# Patient Record
Sex: FEMALE | Race: WHITE | Employment: OTHER | URBAN - METROPOLITAN AREA
[De-identification: names, ages, dates, MRNs, and addresses within clinical notes are randomized per-mention and may not be internally consistent; named-entity substitution may affect disease eponyms.]

---

## 2019-04-04 ENCOUNTER — INITIAL CONSULT (OUTPATIENT)
Dept: NEUROSURGERY | Age: 69
End: 2019-04-04
Payer: COMMERCIAL

## 2019-04-04 VITALS
HEIGHT: 61 IN | WEIGHT: 145 LBS | DIASTOLIC BLOOD PRESSURE: 62 MMHG | SYSTOLIC BLOOD PRESSURE: 132 MMHG | HEART RATE: 89 BPM | BODY MASS INDEX: 27.38 KG/M2

## 2019-04-04 DIAGNOSIS — M54.41 CHRONIC MIDLINE LOW BACK PAIN WITH BILATERAL SCIATICA: Primary | ICD-10-CM

## 2019-04-04 DIAGNOSIS — G89.29 CHRONIC MIDLINE LOW BACK PAIN WITH BILATERAL SCIATICA: Primary | ICD-10-CM

## 2019-04-04 DIAGNOSIS — M54.42 CHRONIC MIDLINE LOW BACK PAIN WITH BILATERAL SCIATICA: Primary | ICD-10-CM

## 2019-04-04 PROCEDURE — 99243 OFF/OP CNSLTJ NEW/EST LOW 30: CPT | Performed by: NEUROLOGICAL SURGERY

## 2019-04-04 RX ORDER — OXYCODONE AND ACETAMINOPHEN 7.5; 325 MG/1; MG/1
1 TABLET ORAL 3 TIMES DAILY PRN
Status: ON HOLD | COMMUNITY
End: 2019-11-24 | Stop reason: HOSPADM

## 2019-04-04 ASSESSMENT — ENCOUNTER SYMPTOMS
BACK PAIN: 1
ABDOMINAL PAIN: 0
EYES NEGATIVE: 1
RESPIRATORY NEGATIVE: 1
BOWEL INCONTINENCE: 0
GASTROINTESTINAL NEGATIVE: 1
ALLERGIC/IMMUNOLOGIC NEGATIVE: 1

## 2019-04-04 NOTE — PATIENT INSTRUCTIONS
hours. Put a thin cloth between the ice pack and your skin. · Take pain medicines exactly as directed. ? If the doctor gave you a prescription medicine for pain, take it as prescribed. ? If you are not taking a prescription pain medicine, ask your doctor if you can take an over-the-counter medicine. · Take short walks several times a day. You can start with 5 to 10 minutes, 3 or 4 times a day, and work up to longer walks. Walk on level surfaces and avoid hills and stairs until your back is better. · Return to work and other activities as soon as you can. Continued rest without activity is usually not good for your back. · To prevent future back pain, do exercises to stretch and strengthen your back and stomach. Learn how to use good posture, safe lifting techniques, and proper body mechanics. When should you call for help? Call your doctor now or seek immediate medical care if:    · You have new or worsening numbness in your legs.     · You have new or worsening weakness in your legs. (This could make it hard to stand up.)     · You lose control of your bladder or bowels.    Watch closely for changes in your health, and be sure to contact your doctor if:    · You have a fever, lose weight, or don't feel well.     · You do not get better as expected. Where can you learn more? Go to [email or web address]://[email or web address]. org and sign in to your [employer] account. Enter F148 in the [facility] box to learn more about \"Back Pain: Care Instructions. \"     If you do not have an account, please click on the \"Sign Up Now\" link. Current as of: September 20, 2018  Content Version: 11.9  © 4923-3285 [employer]. Care instructions adapted under license by [facility] ([facility]). If you have questions about a medical condition or this instruction, always ask your healthcare professional. [address] any warranty or liability for your use of this information.

## 2019-04-04 NOTE — PROGRESS NOTES
Subjective:      Patient ID: Anival Knapp is a 80 y.o. female. Back Pain   This is a chronic problem. The current episode started more than 1 year ago. The problem occurs constantly. The problem has been gradually worsening since onset. The pain is present in the lumbar spine. The quality of the pain is described as aching, burning, cramping, shooting and stabbing. The pain radiates to the right thigh, right knee, left thigh, right foot, left knee and left foot. The pain is at a severity of 8/10. The pain is moderate. The pain is worse during the day. The symptoms are aggravated by position. Stiffness is present in the morning. Associated symptoms include leg pain and weakness. Pertinent negatives include no abdominal pain, bladder incontinence, bowel incontinence, chest pain, dysuria, fever, headaches, numbness, paresis, paresthesias, pelvic pain, perianal numbness, tingling or weight loss. She has tried NSAIDs, ice, analgesics and heat for the symptoms. The treatment provided mild relief. Review of Systems   Constitutional: Negative. Negative for fever and weight loss. HENT: Negative. Eyes: Negative. Respiratory: Negative. Cardiovascular: Negative. Negative for chest pain. Gastrointestinal: Negative. Negative for abdominal pain and bowel incontinence. Endocrine: Negative. Genitourinary: Negative. Negative for bladder incontinence, dysuria and pelvic pain. Musculoskeletal: Positive for back pain. Skin: Negative. Allergic/Immunologic: Negative. Neurological: Positive for weakness. Negative for tingling, numbness, headaches and paresthesias. Hematological: Negative. Psychiatric/Behavioral: Negative. Objective:   Physical Exam   Constitutional: She is oriented to person, place, and time. She appears well-developed and well-nourished. HENT:   Head: Normocephalic and atraumatic.    Right Ear: External ear normal.   Left Ear: External ear normal.   Nose: Nose normal.   Mouth/Throat: Oropharynx is clear and moist. No oropharyngeal exudate. Eyes: Pupils are equal, round, and reactive to light. Conjunctivae and EOM are normal. Right eye exhibits no discharge. Left eye exhibits no discharge. No scleral icterus. Neck: Normal range of motion. Neck supple. No JVD present. No tracheal deviation present. No thyromegaly present. Pulmonary/Chest: Effort normal and breath sounds normal. No respiratory distress. Abdominal: She exhibits no distension. Musculoskeletal: Normal range of motion. She exhibits no edema, tenderness or deformity. Lymphadenopathy:     She has no cervical adenopathy. Neurological: She is alert and oriented to person, place, and time. She has normal strength. She is not disoriented. She displays no atrophy, no tremor and normal reflexes. No cranial nerve deficit or sensory deficit. She exhibits normal muscle tone. She displays a negative Romberg sign. She displays no seizure activity. Coordination and gait normal. GCS eye subscore is 4. GCS verbal subscore is 5. GCS motor subscore is 6. She displays no Babinski's sign on the right side. She displays no Babinski's sign on the left side. Reflex Scores:       Tricep reflexes are 2+ on the right side and 2+ on the left side. Bicep reflexes are 2+ on the right side and 2+ on the left side. Brachioradialis reflexes are 2+ on the right side and 2+ on the left side. Patellar reflexes are 2+ on the right side and 2+ on the left side. Achilles reflexes are 2+ on the right side and 2+ on the left side. Skin: Capillary refill takes less than 2 seconds. No rash noted. No erythema. No pallor. Psychiatric: She has a normal mood and affect. Her behavior is normal. Judgment and thought content normal.   Vitals reviewed. Assessment:      80year old lady who presents with back pain that radiates into both legs. Her MRI shows stenosis at L4-L5 and prior fusion at L5-S1.   She also has positive FABERS bilaterally. This is a workers comp related visit and her allowed codes are 722.2, 724.4 and 738.4. I will send a letter to her referring physician. Plan:      I will get a myelogram to assess her prior fusion and to rule out adjacent segment stenosis. I will also get hip x-rays and flexion extension lumbar x-rays. She needs to quit smoking prior to any additional surgical intervention.         Andreea Mendoza MD

## 2019-04-04 NOTE — LETTER
[address] [facility] [id number]  Phone: [phone]  Fax: [phone]    Mary Peterson MD      April 4, 2019     [address] [place] [address]    Patient: Romie Fernandes  MR Number: <[id number]>  YOB: 1950  Date of Visit: 4/4/2019    Dear  [address] [facility] [address]:    Thank you for the request for consultation for Simone Flanagan to me for the evaluation of back pain. Below are the relevant portions of my assessment and plan of care. Assessment:     80year old lady who presents with back pain that radiates into both legs. Her MRI shows stenosis at L4-L5 and prior fusion at L5-S1. She also has positive FABERS bilaterally. This is a workers comp related visit and her allowed codes are 722.2, 724.4 and 738.4. I will send a letter to her referring physician. Plan:   I will get a myelogram to assess her prior fusion and to rule out adjacent segment stenosis. I will also get hip x-rays and flexion extension lumbar x-rays. She needs to quit smoking prior to any additional surgical intervention. If you have questions, please do not hesitate to call me. I look forward to following Andrew Silvestre along with you.     Sincerely,        Mary Peterson MD

## 2019-04-15 DIAGNOSIS — M54.41 CHRONIC MIDLINE LOW BACK PAIN WITH BILATERAL SCIATICA: Primary | ICD-10-CM

## 2019-04-15 DIAGNOSIS — G89.29 CHRONIC MIDLINE LOW BACK PAIN WITH BILATERAL SCIATICA: Primary | ICD-10-CM

## 2019-04-15 DIAGNOSIS — M54.42 CHRONIC MIDLINE LOW BACK PAIN WITH BILATERAL SCIATICA: Primary | ICD-10-CM

## 2019-04-15 DIAGNOSIS — Z01.812 PRE-PROCEDURE LAB EXAM: ICD-10-CM

## 2019-04-17 ENCOUNTER — HOSPITAL ENCOUNTER (OUTPATIENT)
Age: 69
Discharge: HOME | End: 2019-04-19
Payer: COMMERCIAL

## 2019-04-17 ENCOUNTER — HOSPITAL ENCOUNTER (OUTPATIENT)
Dept: GENERAL RADIOLOGY | Age: 69
Discharge: HOME | End: 2019-04-19
Payer: COMMERCIAL

## 2019-04-17 DIAGNOSIS — M54.41 CHRONIC MIDLINE LOW BACK PAIN WITH BILATERAL SCIATICA: ICD-10-CM

## 2019-04-17 DIAGNOSIS — G89.29 CHRONIC MIDLINE LOW BACK PAIN WITH BILATERAL SCIATICA: ICD-10-CM

## 2019-04-17 DIAGNOSIS — M54.42 CHRONIC MIDLINE LOW BACK PAIN WITH BILATERAL SCIATICA: ICD-10-CM

## 2019-04-17 PROCEDURE — 73521 X-RAY EXAM HIPS BI 2 VIEWS: CPT

## 2019-04-17 PROCEDURE — 72120 X-RAY BEND ONLY L-S SPINE: CPT

## 2019-05-01 ENCOUNTER — HOSPITAL ENCOUNTER (OUTPATIENT)
Dept: CT IMAGING | Age: 69
Discharge: HOME | End: 2019-05-03
Payer: COMMERCIAL

## 2019-05-01 ENCOUNTER — HOSPITAL ENCOUNTER (OUTPATIENT)
Dept: GENERAL RADIOLOGY | Age: 69
Discharge: HOME | End: 2019-05-03
Payer: COMMERCIAL

## 2019-05-01 VITALS
DIASTOLIC BLOOD PRESSURE: 65 MMHG | HEIGHT: 61 IN | BODY MASS INDEX: 27.19 KG/M2 | RESPIRATION RATE: 16 BRPM | SYSTOLIC BLOOD PRESSURE: 169 MMHG | TEMPERATURE: 98 F | HEART RATE: 68 BPM | OXYGEN SATURATION: 96 % | WEIGHT: 144 LBS

## 2019-05-01 DIAGNOSIS — G89.29 CHRONIC MIDLINE LOW BACK PAIN WITH BILATERAL SCIATICA: ICD-10-CM

## 2019-05-01 DIAGNOSIS — M54.41 CHRONIC MIDLINE LOW BACK PAIN WITH BILATERAL SCIATICA: ICD-10-CM

## 2019-05-01 DIAGNOSIS — M54.42 CHRONIC MIDLINE LOW BACK PAIN WITH BILATERAL SCIATICA: ICD-10-CM

## 2019-05-01 LAB
INR BLD: 1
PLATELET # BLD: 216 E9/L (ref 130–450)
PROTHROMBIN TIME: 11.5 SEC (ref 9.3–12.4)

## 2019-05-01 PROCEDURE — 72132 CT LUMBAR SPINE W/DYE: CPT

## 2019-05-01 PROCEDURE — 85049 AUTOMATED PLATELET COUNT: CPT

## 2019-05-01 PROCEDURE — 6360000004 HC RX CONTRAST MEDICATION: Performed by: NEUROLOGICAL SURGERY

## 2019-05-01 PROCEDURE — 85610 PROTHROMBIN TIME: CPT

## 2019-05-01 PROCEDURE — 36415 COLL VENOUS BLD VENIPUNCTURE: CPT

## 2019-05-01 PROCEDURE — 7100000011 HC PHASE II RECOVERY - ADDTL 15 MIN

## 2019-05-01 PROCEDURE — 72265 MYELOGRAPHY L-S SPINE: CPT

## 2019-05-01 PROCEDURE — 7100000010 HC PHASE II RECOVERY - FIRST 15 MIN

## 2019-05-01 RX ADMIN — IOPAMIDOL 15 ML: 408 INJECTION, SOLUTION INTRATHECAL at 12:33

## 2019-05-01 ASSESSMENT — PAIN - FUNCTIONAL ASSESSMENT: PAIN_FUNCTIONAL_ASSESSMENT: 0-10

## 2019-05-01 ASSESSMENT — PAIN DESCRIPTION - DESCRIPTORS: DESCRIPTORS: CONSTANT;DULL

## 2019-05-01 NOTE — PROGRESS NOTES
1000-Patient arrived ambulatory with   to Radiology department for Lumbar Myelogram. Allergies, home medications, H&P and fasting instructions reviewed with patient. Vital signs taken. IV placed, blood obtained, IV flushed and prn adapter attached. Blood sample sent to lab for ordered tests. 1025- Procedural instructions given, questions answered, understanding expressed and consent signed. 805 Megan Grimes- Dr Delicia Thao here and ta;johns to patient. 1200-To xray per cart. 1315- report from [facility]. Patient eating and drinking well. 1350-Discharge instructions reviewed with patient and understanding expressed. Signed and copy given to patient. Patient prepared for discharge, up and dressed, denies discomfort, dressing over puncture site dry and intact. 1400-Taken to door ambulatory and released with discharge instructions to .

## 2019-05-01 NOTE — PROGRESS NOTES
1255 - Patient returned to angio holding, Vitals taken. Patient awake and alert. No s/s of any complication noted or reported. Bandage at site clean, dry, and intact, no drainage noted. 1315 - Patient eating and drinking well. Site clean, dry, and intact. Report given to [facility].

## 2019-05-01 NOTE — PROGRESS NOTES
Interventional Radiology Preprocedure Note    HPI:  Andrey Fu is a 77 y.o. female with history of L5-S1 fusion and persistent BLE pain and numbness who was referred to Interventional Radiology by Dr. Remigio Lundborg for a myelogram.    Allergies: Allergies   Allergen Reactions    Biaxin [Clarithromycin] Hives    Darvon [Fd&C Red #40-Fd&C Yellow #10-Propoxyphene]     Keflex [Cephalexin] Nausea Only    Meloxicam Nausea Only    Methadone Nausea Only    Niacin And Related     Nsaids Other (See Comments)     unknown    Penicillins     Polysorbate Other (See Comments)     unknown    Statins Other (See Comments)     unknown    Yellow Dyes (Non-Tartrazine) Other (See Comments)    Sulfa Antibiotics Rash       Vital Signs:  Vitals:    05/01/19 1011   BP: (!) 152/73   Pulse: 82   Resp: 16   Temp: 97.8 °F (36.6 °C)   TempSrc: Oral   SpO2: 96%   Weight: 144 lb (65.3 kg)   Height: 5' 1\" (1.549 m)     Exam:  No apparent distress. AAO x 3. Face symmetric. Strength 5/5 in BUE. No drift. Strength 5/5 in RLE. 4/5 in left iliopsoas, otherwise 5/5 in LLE. Lab Results:    Lab Results   Component Value Date    INR 1.0 05/01/2019    PROTIME 11.5 05/01/2019     05/01/2019       Imaging:  I have personally reviewed the patient's pertinent imaging. ***    Assessment and Plan:  ***    The patient appears able to tolerate the procedure. Informed Consent:  Informed consent was obtained ***. The details of the procedure, and its risks, benefits, and alternatives, were discussed. These risks include, but are not limited to, ***. The *** indicated understanding and gave permission to proceed.     Electronically signed by Jordan Cali MD on 5/1/2019 at 10:52 AM

## 2019-05-30 ENCOUNTER — TELEPHONE (OUTPATIENT)
Dept: NEUROSURGERY | Age: 69
End: 2019-05-30

## 2019-07-23 ENCOUNTER — OFFICE VISIT (OUTPATIENT)
Dept: NEUROSURGERY | Age: 69
End: 2019-07-23
Payer: COMMERCIAL

## 2019-07-23 VITALS
WEIGHT: 145 LBS | HEART RATE: 81 BPM | DIASTOLIC BLOOD PRESSURE: 65 MMHG | BODY MASS INDEX: 27.38 KG/M2 | HEIGHT: 61 IN | SYSTOLIC BLOOD PRESSURE: 114 MMHG

## 2019-07-23 DIAGNOSIS — M54.42 CHRONIC MIDLINE LOW BACK PAIN WITH BILATERAL SCIATICA: Primary | ICD-10-CM

## 2019-07-23 DIAGNOSIS — G89.29 CHRONIC MIDLINE LOW BACK PAIN WITH BILATERAL SCIATICA: Primary | ICD-10-CM

## 2019-07-23 DIAGNOSIS — M54.41 CHRONIC MIDLINE LOW BACK PAIN WITH BILATERAL SCIATICA: Primary | ICD-10-CM

## 2019-07-23 PROCEDURE — 99213 OFFICE O/P EST LOW 20 MIN: CPT | Performed by: NEUROLOGICAL SURGERY

## 2019-08-13 ENCOUNTER — PREP FOR PROCEDURE (OUTPATIENT)
Dept: NEUROSURGERY | Age: 69
End: 2019-08-13

## 2019-08-13 DIAGNOSIS — M48.061 SPINAL STENOSIS OF LUMBAR REGION WITHOUT NEUROGENIC CLAUDICATION: Primary | ICD-10-CM

## 2019-08-13 RX ORDER — SODIUM CHLORIDE 0.9 % (FLUSH) 0.9 %
10 SYRINGE (ML) INJECTION EVERY 12 HOURS SCHEDULED
Status: CANCELLED | OUTPATIENT
Start: 2019-08-13

## 2019-08-13 RX ORDER — SODIUM CHLORIDE 0.9 % (FLUSH) 0.9 %
10 SYRINGE (ML) INJECTION PRN
Status: CANCELLED | OUTPATIENT
Start: 2019-08-13

## 2019-08-13 RX ORDER — SODIUM CHLORIDE 9 MG/ML
INJECTION, SOLUTION INTRAVENOUS CONTINUOUS
Status: CANCELLED | OUTPATIENT
Start: 2019-08-13

## 2019-09-25 ENCOUNTER — ANESTHESIA EVENT (OUTPATIENT)
Dept: OPERATING ROOM | Age: 69
End: 2019-09-25

## 2019-09-25 ENCOUNTER — HOSPITAL ENCOUNTER (OUTPATIENT)
Dept: PREADMISSION TESTING | Age: 69
Discharge: HOME | End: 2019-09-25
Payer: COMMERCIAL

## 2019-09-25 ENCOUNTER — HOSPITAL ENCOUNTER (OUTPATIENT)
Dept: GENERAL RADIOLOGY | Age: 69
Discharge: HOME | End: 2019-09-27
Payer: COMMERCIAL

## 2019-09-25 VITALS
SYSTOLIC BLOOD PRESSURE: 162 MMHG | OXYGEN SATURATION: 96 % | HEART RATE: 72 BPM | BODY MASS INDEX: 28.13 KG/M2 | WEIGHT: 149 LBS | HEIGHT: 61 IN | RESPIRATION RATE: 16 BRPM | TEMPERATURE: 97.8 F | DIASTOLIC BLOOD PRESSURE: 72 MMHG

## 2019-09-25 DIAGNOSIS — M48.061 SPINAL STENOSIS OF LUMBAR REGION WITHOUT NEUROGENIC CLAUDICATION: ICD-10-CM

## 2019-09-25 DIAGNOSIS — Z01.818 PRE-OP TESTING: Primary | ICD-10-CM

## 2019-09-25 LAB
ABO/RH: NORMAL
ANION GAP SERPL CALCULATED.3IONS-SCNC: 9 MMOL/L (ref 7–16)
ANTIBODY SCREEN: NORMAL
BACTERIA: NORMAL /HPF
BASOPHILS ABSOLUTE: 0.04 E9/L (ref 0–0.2)
BASOPHILS RELATIVE PERCENT: 0.9 % (ref 0–2)
BILIRUBIN URINE: NEGATIVE
BLOOD, URINE: NEGATIVE
BUN BLDV-MCNC: 15 MG/DL (ref 8–23)
CALCIUM SERPL-MCNC: 9.5 MG/DL (ref 8.6–10.2)
CHLORIDE BLD-SCNC: 100 MMOL/L (ref 98–107)
CLARITY: CLEAR
CO2: 28 MMOL/L (ref 22–29)
COLOR: YELLOW
CREAT SERPL-MCNC: 1.1 MG/DL (ref 0.5–1)
EOSINOPHILS ABSOLUTE: 0.28 E9/L (ref 0.05–0.5)
EOSINOPHILS RELATIVE PERCENT: 6.2 % (ref 0–6)
GFR AFRICAN AMERICAN: 60
GFR NON-AFRICAN AMERICAN: 49 ML/MIN/1.73
GLUCOSE BLD-MCNC: 180 MG/DL (ref 74–99)
GLUCOSE URINE: NEGATIVE MG/DL
HCT VFR BLD CALC: 39.5 % (ref 34–48)
HEMOGLOBIN: 12.1 G/DL (ref 11.5–15.5)
IMMATURE GRANULOCYTES #: 0.01 E9/L
IMMATURE GRANULOCYTES %: 0.2 % (ref 0–5)
INR BLD: 1
KETONES, URINE: NEGATIVE MG/DL
LEUKOCYTE ESTERASE, URINE: ABNORMAL
LYMPHOCYTES ABSOLUTE: 1.33 E9/L (ref 1.5–4)
LYMPHOCYTES RELATIVE PERCENT: 29.2 % (ref 20–42)
MCH RBC QN AUTO: 28.3 PG (ref 26–35)
MCHC RBC AUTO-ENTMCNC: 30.6 % (ref 32–34.5)
MCV RBC AUTO: 92.3 FL (ref 80–99.9)
MONOCYTES ABSOLUTE: 0.38 E9/L (ref 0.1–0.95)
MONOCYTES RELATIVE PERCENT: 8.4 % (ref 2–12)
NEUTROPHILS ABSOLUTE: 2.51 E9/L (ref 1.8–7.3)
NEUTROPHILS RELATIVE PERCENT: 55.1 % (ref 43–80)
NITRITE, URINE: NEGATIVE
PDW BLD-RTO: 14 FL (ref 11.5–15)
PH UA: 6.5 (ref 5–9)
PLATELET # BLD: 179 E9/L (ref 130–450)
PMV BLD AUTO: 11 FL (ref 7–12)
POTASSIUM REFLEX MAGNESIUM: 4.1 MMOL/L (ref 3.5–5)
PROTEIN UA: NEGATIVE MG/DL
PROTHROMBIN TIME: 11 SEC (ref 9.3–12.4)
RBC # BLD: 4.28 E12/L (ref 3.5–5.5)
RBC UA: NORMAL /HPF (ref 0–2)
SODIUM BLD-SCNC: 137 MMOL/L (ref 132–146)
SPECIFIC GRAVITY UA: <=1.005 (ref 1–1.03)
UROBILINOGEN, URINE: 0.2 E.U./DL
WBC # BLD: 4.6 E9/L (ref 4.5–11.5)
WBC UA: NORMAL /HPF (ref 0–5)

## 2019-09-25 PROCEDURE — 86900 BLOOD TYPING SEROLOGIC ABO: CPT

## 2019-09-25 PROCEDURE — 87081 CULTURE SCREEN ONLY: CPT

## 2019-09-25 PROCEDURE — 85025 COMPLETE CBC W/AUTO DIFF WBC: CPT

## 2019-09-25 PROCEDURE — 85610 PROTHROMBIN TIME: CPT

## 2019-09-25 PROCEDURE — 80048 BASIC METABOLIC PNL TOTAL CA: CPT

## 2019-09-25 PROCEDURE — 86901 BLOOD TYPING SEROLOGIC RH(D): CPT

## 2019-09-25 PROCEDURE — 81001 URINALYSIS AUTO W/SCOPE: CPT

## 2019-09-25 PROCEDURE — 36415 COLL VENOUS BLD VENIPUNCTURE: CPT

## 2019-09-25 PROCEDURE — 86850 RBC ANTIBODY SCREEN: CPT

## 2019-09-25 PROCEDURE — 87088 URINE BACTERIA CULTURE: CPT

## 2019-09-25 PROCEDURE — 71046 X-RAY EXAM CHEST 2 VIEWS: CPT

## 2019-09-25 ASSESSMENT — PAIN DESCRIPTION - PAIN TYPE: TYPE: ACUTE PAIN

## 2019-09-25 ASSESSMENT — PAIN SCALES - GENERAL: PAINLEVEL_OUTOF10: 5

## 2019-09-25 ASSESSMENT — PAIN DESCRIPTION - ORIENTATION: ORIENTATION: LOWER;RIGHT;LEFT

## 2019-09-25 ASSESSMENT — PAIN DESCRIPTION - LOCATION: LOCATION: BACK;LEG

## 2019-09-26 LAB — MRSA CULTURE ONLY: NORMAL

## 2019-09-27 LAB — URINE CULTURE, ROUTINE: NORMAL

## 2019-10-01 ENCOUNTER — OFFICE VISIT (OUTPATIENT)
Dept: NEUROSURGERY | Age: 69
End: 2019-10-01
Payer: COMMERCIAL

## 2019-10-01 VITALS
HEIGHT: 61 IN | BODY MASS INDEX: 27.19 KG/M2 | DIASTOLIC BLOOD PRESSURE: 82 MMHG | HEART RATE: 77 BPM | SYSTOLIC BLOOD PRESSURE: 135 MMHG | WEIGHT: 144 LBS

## 2019-10-01 DIAGNOSIS — G89.29 CHRONIC MIDLINE LOW BACK PAIN WITH LEFT-SIDED SCIATICA: Primary | ICD-10-CM

## 2019-10-01 DIAGNOSIS — M54.42 CHRONIC MIDLINE LOW BACK PAIN WITH LEFT-SIDED SCIATICA: Primary | ICD-10-CM

## 2019-10-01 PROCEDURE — 99213 OFFICE O/P EST LOW 20 MIN: CPT | Performed by: NEUROLOGICAL SURGERY

## 2019-10-03 ENCOUNTER — TELEPHONE (OUTPATIENT)
Dept: NEUROSURGERY | Age: 69
End: 2019-10-03

## 2019-10-04 ENCOUNTER — ANESTHESIA (OUTPATIENT)
Dept: OPERATING ROOM | Age: 69
End: 2019-10-04

## 2019-10-15 ENCOUNTER — PREP FOR PROCEDURE (OUTPATIENT)
Dept: NEUROSURGERY | Age: 69
End: 2019-10-15

## 2019-10-15 DIAGNOSIS — M51.36 LUMBAR DEGENERATIVE DISC DISEASE: Primary | ICD-10-CM

## 2019-10-15 RX ORDER — SODIUM CHLORIDE 9 MG/ML
INJECTION, SOLUTION INTRAVENOUS CONTINUOUS
Status: CANCELLED | OUTPATIENT
Start: 2019-10-15

## 2019-10-15 RX ORDER — SODIUM CHLORIDE 0.9 % (FLUSH) 0.9 %
10 SYRINGE (ML) INJECTION EVERY 12 HOURS SCHEDULED
Status: CANCELLED | OUTPATIENT
Start: 2019-10-15

## 2019-10-15 RX ORDER — SODIUM CHLORIDE 0.9 % (FLUSH) 0.9 %
10 SYRINGE (ML) INJECTION PRN
Status: CANCELLED | OUTPATIENT
Start: 2019-10-15

## 2019-11-15 RX ORDER — CYCLOBENZAPRINE HCL 10 MG
5 TABLET ORAL NIGHTLY PRN
Status: ON HOLD | COMMUNITY
End: 2019-11-23 | Stop reason: HOSPADM

## 2019-11-18 ENCOUNTER — HOSPITAL ENCOUNTER (OUTPATIENT)
Dept: GENERAL RADIOLOGY | Age: 69
Discharge: HOME | End: 2019-11-20
Admitting: PHYSICIAN ASSISTANT
Payer: COMMERCIAL

## 2019-11-18 ENCOUNTER — HOSPITAL ENCOUNTER (OUTPATIENT)
Dept: PREADMISSION TESTING | Age: 69
Setting detail: SURGERY ADMIT
Discharge: HOME | DRG: 454 | End: 2019-11-18
Payer: COMMERCIAL

## 2019-11-18 ENCOUNTER — ANESTHESIA EVENT (OUTPATIENT)
Dept: OPERATING ROOM | Age: 69
DRG: 454 | End: 2019-11-18
Payer: COMMERCIAL

## 2019-11-18 VITALS
TEMPERATURE: 97.7 F | OXYGEN SATURATION: 94 % | DIASTOLIC BLOOD PRESSURE: 76 MMHG | SYSTOLIC BLOOD PRESSURE: 178 MMHG | HEART RATE: 82 BPM | RESPIRATION RATE: 18 BRPM

## 2019-11-18 DIAGNOSIS — Z01.812 PRE-OPERATIVE LABORATORY EXAMINATION: Primary | ICD-10-CM

## 2019-11-18 DIAGNOSIS — M51.36 LUMBAR DEGENERATIVE DISC DISEASE: ICD-10-CM

## 2019-11-18 LAB
ABO/RH: NORMAL
ANION GAP SERPL CALCULATED.3IONS-SCNC: 13 MMOL/L (ref 7–16)
ANTIBODY SCREEN: NORMAL
BACTERIA: NORMAL /HPF
BASOPHILS ABSOLUTE: 0.05 E9/L (ref 0–0.2)
BASOPHILS RELATIVE PERCENT: 1.1 % (ref 0–2)
BILIRUBIN URINE: NEGATIVE
BLOOD, URINE: NEGATIVE
BUN BLDV-MCNC: 21 MG/DL (ref 8–23)
CALCIUM SERPL-MCNC: 9.1 MG/DL (ref 8.6–10.2)
CHLORIDE BLD-SCNC: 103 MMOL/L (ref 98–107)
CLARITY: CLEAR
CO2: 22 MMOL/L (ref 22–29)
COLOR: YELLOW
CREAT SERPL-MCNC: 1 MG/DL (ref 0.5–1)
EOSINOPHILS ABSOLUTE: 0.33 E9/L (ref 0.05–0.5)
EOSINOPHILS RELATIVE PERCENT: 7 % (ref 0–6)
GFR AFRICAN AMERICAN: >60
GFR NON-AFRICAN AMERICAN: 55 ML/MIN/1.73
GLUCOSE BLD-MCNC: 160 MG/DL (ref 74–99)
GLUCOSE URINE: NEGATIVE MG/DL
HCT VFR BLD CALC: 35.4 % (ref 34–48)
HEMOGLOBIN: 10.9 G/DL (ref 11.5–15.5)
IMMATURE GRANULOCYTES #: 0.01 E9/L
IMMATURE GRANULOCYTES %: 0.2 % (ref 0–5)
INR BLD: 1
KETONES, URINE: NEGATIVE MG/DL
LEUKOCYTE ESTERASE, URINE: ABNORMAL
LYMPHOCYTES ABSOLUTE: 1.53 E9/L (ref 1.5–4)
LYMPHOCYTES RELATIVE PERCENT: 32.5 % (ref 20–42)
MCH RBC QN AUTO: 28.4 PG (ref 26–35)
MCHC RBC AUTO-ENTMCNC: 30.8 % (ref 32–34.5)
MCV RBC AUTO: 92.2 FL (ref 80–99.9)
MONOCYTES ABSOLUTE: 0.49 E9/L (ref 0.1–0.95)
MONOCYTES RELATIVE PERCENT: 10.4 % (ref 2–12)
NEUTROPHILS ABSOLUTE: 2.3 E9/L (ref 1.8–7.3)
NEUTROPHILS RELATIVE PERCENT: 48.8 % (ref 43–80)
NITRITE, URINE: NEGATIVE
PDW BLD-RTO: 14.3 FL (ref 11.5–15)
PH UA: 6 (ref 5–9)
PLATELET # BLD: 205 E9/L (ref 130–450)
PMV BLD AUTO: 10.4 FL (ref 7–12)
POTASSIUM REFLEX MAGNESIUM: 4.1 MMOL/L (ref 3.5–5)
PROTEIN UA: NEGATIVE MG/DL
PROTHROMBIN TIME: 11 SEC (ref 9.3–12.4)
RBC # BLD: 3.84 E12/L (ref 3.5–5.5)
RBC UA: NORMAL /HPF (ref 0–2)
RENAL EPITHELIAL, UA: NORMAL /HPF
SODIUM BLD-SCNC: 138 MMOL/L (ref 132–146)
SPECIFIC GRAVITY UA: 1.01 (ref 1–1.03)
UROBILINOGEN, URINE: 0.2 E.U./DL
WBC # BLD: 4.7 E9/L (ref 4.5–11.5)
WBC UA: NORMAL /HPF (ref 0–5)

## 2019-11-18 PROCEDURE — 36415 COLL VENOUS BLD VENIPUNCTURE: CPT

## 2019-11-18 PROCEDURE — 86900 BLOOD TYPING SEROLOGIC ABO: CPT

## 2019-11-18 PROCEDURE — 85610 PROTHROMBIN TIME: CPT

## 2019-11-18 PROCEDURE — 81001 URINALYSIS AUTO W/SCOPE: CPT

## 2019-11-18 PROCEDURE — 71046 X-RAY EXAM CHEST 2 VIEWS: CPT

## 2019-11-18 PROCEDURE — 87088 URINE BACTERIA CULTURE: CPT

## 2019-11-18 PROCEDURE — 87186 SC STD MICRODIL/AGAR DIL: CPT

## 2019-11-18 PROCEDURE — 87147 CULTURE TYPE IMMUNOLOGIC: CPT

## 2019-11-18 PROCEDURE — 85025 COMPLETE CBC W/AUTO DIFF WBC: CPT

## 2019-11-18 PROCEDURE — 86850 RBC ANTIBODY SCREEN: CPT

## 2019-11-18 PROCEDURE — 86901 BLOOD TYPING SEROLOGIC RH(D): CPT

## 2019-11-18 PROCEDURE — 80048 BASIC METABOLIC PNL TOTAL CA: CPT

## 2019-11-18 PROCEDURE — 87081 CULTURE SCREEN ONLY: CPT

## 2019-11-18 ASSESSMENT — PAIN - FUNCTIONAL ASSESSMENT: PAIN_FUNCTIONAL_ASSESSMENT: PREVENTS OR INTERFERES SOME ACTIVE ACTIVITIES AND ADLS

## 2019-11-18 ASSESSMENT — PAIN SCALES - GENERAL: PAINLEVEL_OUTOF10: 7

## 2019-11-18 ASSESSMENT — PAIN DESCRIPTION - LOCATION: LOCATION: BACK

## 2019-11-18 ASSESSMENT — PAIN DESCRIPTION - ORIENTATION: ORIENTATION: LOWER;RIGHT;LEFT

## 2019-11-18 ASSESSMENT — PAIN DESCRIPTION - FREQUENCY: FREQUENCY: CONTINUOUS

## 2019-11-18 ASSESSMENT — LIFESTYLE VARIABLES: SMOKING_STATUS: 1

## 2019-11-18 ASSESSMENT — PAIN DESCRIPTION - DESCRIPTORS: DESCRIPTORS: SHOOTING;CONSTANT

## 2019-11-18 ASSESSMENT — PAIN DESCRIPTION - PAIN TYPE: TYPE: CHRONIC PAIN

## 2019-11-19 LAB — MRSA CULTURE ONLY: NORMAL

## 2019-11-20 ENCOUNTER — APPOINTMENT (OUTPATIENT)
Dept: GENERAL RADIOLOGY | Age: 69
DRG: 454 | End: 2019-11-20
Attending: NEUROLOGICAL SURGERY
Payer: COMMERCIAL

## 2019-11-20 ENCOUNTER — ANESTHESIA (OUTPATIENT)
Dept: OPERATING ROOM | Age: 69
DRG: 454 | End: 2019-11-20
Payer: COMMERCIAL

## 2019-11-20 ENCOUNTER — HOSPITAL ENCOUNTER (INPATIENT)
Age: 69
LOS: 4 days | Discharge: HOME HEALTH | DRG: 454 | End: 2019-11-24
Attending: NEUROLOGICAL SURGERY | Admitting: NEUROLOGICAL SURGERY
Payer: COMMERCIAL

## 2019-11-20 VITALS
TEMPERATURE: 97.2 F | OXYGEN SATURATION: 100 % | SYSTOLIC BLOOD PRESSURE: 124 MMHG | RESPIRATION RATE: 9 BRPM | DIASTOLIC BLOOD PRESSURE: 62 MMHG

## 2019-11-20 DIAGNOSIS — M54.50 LOW BACK PAIN, UNSPECIFIED BACK PAIN LATERALITY, UNSPECIFIED CHRONICITY, UNSPECIFIED WHETHER SCIATICA PRESENT: ICD-10-CM

## 2019-11-20 DIAGNOSIS — M48.061 LUMBAR STENOSIS WITHOUT NEUROGENIC CLAUDICATION: Primary | ICD-10-CM

## 2019-11-20 LAB
ORGANISM: ABNORMAL
URINE CULTURE, ROUTINE: ABNORMAL
URINE CULTURE, ROUTINE: ABNORMAL

## 2019-11-20 PROCEDURE — 2580000003 HC RX 258: Performed by: PHYSICIAN ASSISTANT

## 2019-11-20 PROCEDURE — 22853 INSJ BIOMECHANICAL DEVICE: CPT | Performed by: NEUROLOGICAL SURGERY

## 2019-11-20 PROCEDURE — 0SG00AJ FUSION OF LUMBAR VERTEBRAL JOINT WITH INTERBODY FUSION DEVICE, POSTERIOR APPROACH, ANTERIOR COLUMN, OPEN APPROACH: ICD-10-PCS | Performed by: NEUROLOGICAL SURGERY

## 2019-11-20 PROCEDURE — 00NY0ZZ RELEASE LUMBAR SPINAL CORD, OPEN APPROACH: ICD-10-PCS | Performed by: NEUROLOGICAL SURGERY

## 2019-11-20 PROCEDURE — 6360000002 HC RX W HCPCS: Performed by: NEUROLOGICAL SURGERY

## 2019-11-20 PROCEDURE — 2580000003 HC RX 258

## 2019-11-20 PROCEDURE — 22840 INSERT SPINE FIXATION DEVICE: CPT | Performed by: PHYSICIAN ASSISTANT

## 2019-11-20 PROCEDURE — 2780000010 HC IMPLANT OTHER: Performed by: NEUROLOGICAL SURGERY

## 2019-11-20 PROCEDURE — 2720000010 HC SURG SUPPLY STERILE: Performed by: NEUROLOGICAL SURGERY

## 2019-11-20 PROCEDURE — 6370000000 HC RX 637 (ALT 250 FOR IP): Performed by: NEUROLOGICAL SURGERY

## 2019-11-20 PROCEDURE — 2500000003 HC RX 250 WO HCPCS

## 2019-11-20 PROCEDURE — 88311 DECALCIFY TISSUE: CPT

## 2019-11-20 PROCEDURE — 6360000002 HC RX W HCPCS: Performed by: ANESTHESIOLOGY

## 2019-11-20 PROCEDURE — 0SG0071 FUSION OF LUMBAR VERTEBRAL JOINT WITH AUTOLOGOUS TISSUE SUBSTITUTE, POSTERIOR APPROACH, POSTERIOR COLUMN, OPEN APPROACH: ICD-10-PCS | Performed by: NEUROLOGICAL SURGERY

## 2019-11-20 PROCEDURE — 6360000002 HC RX W HCPCS

## 2019-11-20 PROCEDURE — 3600000005 HC SURGERY LEVEL 5 BASE: Performed by: NEUROLOGICAL SURGERY

## 2019-11-20 PROCEDURE — 2500000003 HC RX 250 WO HCPCS: Performed by: NEUROLOGICAL SURGERY

## 2019-11-20 PROCEDURE — 22633 ARTHRD CMBN 1NTRSPC LUMBAR: CPT | Performed by: PHYSICIAN ASSISTANT

## 2019-11-20 PROCEDURE — 88304 TISSUE EXAM BY PATHOLOGIST: CPT

## 2019-11-20 PROCEDURE — C1713 ANCHOR/SCREW BN/BN,TIS/BN: HCPCS | Performed by: NEUROLOGICAL SURGERY

## 2019-11-20 PROCEDURE — 0ST20ZZ RESECTION OF LUMBAR VERTEBRAL DISC, OPEN APPROACH: ICD-10-PCS | Performed by: NEUROLOGICAL SURGERY

## 2019-11-20 PROCEDURE — 3209999900 FLUORO FOR SURGICAL PROCEDURES

## 2019-11-20 PROCEDURE — 00QT0ZZ REPAIR SPINAL MENINGES, OPEN APPROACH: ICD-10-PCS | Performed by: NEUROLOGICAL SURGERY

## 2019-11-20 PROCEDURE — 1200000000 HC SEMI PRIVATE

## 2019-11-20 PROCEDURE — 22840 INSERT SPINE FIXATION DEVICE: CPT | Performed by: NEUROLOGICAL SURGERY

## 2019-11-20 PROCEDURE — 95940 IONM IN OPERATNG ROOM 15 MIN: CPT | Performed by: AUDIOLOGIST

## 2019-11-20 PROCEDURE — 2580000003 HC RX 258: Performed by: NEUROLOGICAL SURGERY

## 2019-11-20 PROCEDURE — 88300 SURGICAL PATH GROSS: CPT

## 2019-11-20 PROCEDURE — 3600000015 HC SURGERY LEVEL 5 ADDTL 15MIN: Performed by: NEUROLOGICAL SURGERY

## 2019-11-20 PROCEDURE — 22853 INSJ BIOMECHANICAL DEVICE: CPT | Performed by: PHYSICIAN ASSISTANT

## 2019-11-20 PROCEDURE — 3700000000 HC ANESTHESIA ATTENDED CARE: Performed by: NEUROLOGICAL SURGERY

## 2019-11-20 PROCEDURE — 3E0U0GB INTRODUCTION OF RECOMBINANT BONE MORPHOGENETIC PROTEIN INTO JOINTS, OPEN APPROACH: ICD-10-PCS | Performed by: NEUROLOGICAL SURGERY

## 2019-11-20 PROCEDURE — 22633 ARTHRD CMBN 1NTRSPC LUMBAR: CPT | Performed by: NEUROLOGICAL SURGERY

## 2019-11-20 PROCEDURE — 3700000001 HC ADD 15 MINUTES (ANESTHESIA): Performed by: NEUROLOGICAL SURGERY

## 2019-11-20 PROCEDURE — 95908 NRV CNDJ TST 3-4 STUDIES: CPT | Performed by: AUDIOLOGIST

## 2019-11-20 PROCEDURE — 0QP004Z REMOVAL OF INTERNAL FIXATION DEVICE FROM LUMBAR VERTEBRA, OPEN APPROACH: ICD-10-PCS | Performed by: NEUROLOGICAL SURGERY

## 2019-11-20 PROCEDURE — P9041 ALBUMIN (HUMAN),5%, 50ML: HCPCS

## 2019-11-20 PROCEDURE — 6360000002 HC RX W HCPCS: Performed by: PHYSICIAN ASSISTANT

## 2019-11-20 PROCEDURE — 7100000000 HC PACU RECOVERY - FIRST 15 MIN: Performed by: NEUROLOGICAL SURGERY

## 2019-11-20 PROCEDURE — 7100000001 HC PACU RECOVERY - ADDTL 15 MIN: Performed by: NEUROLOGICAL SURGERY

## 2019-11-20 PROCEDURE — 0QP104Z REMOVAL OF INTERNAL FIXATION DEVICE FROM SACRUM, OPEN APPROACH: ICD-10-PCS | Performed by: NEUROLOGICAL SURGERY

## 2019-11-20 PROCEDURE — 2709999900 HC NON-CHARGEABLE SUPPLY: Performed by: NEUROLOGICAL SURGERY

## 2019-11-20 DEVICE — CALIBER SPACER 10 X 22MM, 12-17MM
Type: IMPLANTABLE DEVICE | Site: SPINE LUMBAR | Status: FUNCTIONAL
Brand: CALIBER

## 2019-11-20 DEVICE — DURASEAL® EXACT SPINAL SEALANT SYSTEM 5ML 5 PACK
Type: IMPLANTABLE DEVICE | Site: SPINE LUMBAR | Status: FUNCTIONAL
Brand: DURASEAL EXACT SPINAL SEALANT SYSTEM

## 2019-11-20 DEVICE — BONE GRAFT KIT 7510400 INFUSE MEDIUM
Type: IMPLANTABLE DEVICE | Site: SPINE LUMBAR | Status: FUNCTIONAL
Brand: INFUSE® BONE GRAFT

## 2019-11-20 DEVICE — THREADED LOCKING CAP, CREO
Type: IMPLANTABLE DEVICE | Site: SPINE LUMBAR | Status: FUNCTIONAL
Brand: CREO

## 2019-11-20 DEVICE — CREO® THREADED 6.5 X 45MM POLYAXIAL SCREW
Type: IMPLANTABLE DEVICE | Site: SPINE LUMBAR | Status: FUNCTIONAL
Brand: CREO

## 2019-11-20 DEVICE — CREO® THREADED 7.5 X 45MM POLYAXIAL SCREW
Type: IMPLANTABLE DEVICE | Site: SPINE LUMBAR | Status: FUNCTIONAL
Brand: CREO

## 2019-11-20 DEVICE — COLLAGEN DURAL REGENERATION MEMBRANE 2IN X 2IN (5CM X 5CM)
Type: IMPLANTABLE DEVICE | Site: SPINE LUMBAR | Status: FUNCTIONAL
Brand: DURAMATRIX-ONLAY PLUS

## 2019-11-20 DEVICE — CREO® THREADED 6.5 X 50MM POLYAXIAL SCREW
Type: IMPLANTABLE DEVICE | Site: SPINE LUMBAR | Status: FUNCTIONAL
Brand: CREO

## 2019-11-20 DEVICE — 5.5MM CURVED ROD, TITANIUM ALLOY, 40MM LENGTH
Type: IMPLANTABLE DEVICE | Site: SPINE LUMBAR | Status: FUNCTIONAL
Brand: CREO

## 2019-11-20 RX ORDER — BUPIVACAINE HYDROCHLORIDE 2.5 MG/ML
INJECTION, SOLUTION EPIDURAL; INFILTRATION; INTRACAUDAL PRN
Status: DISCONTINUED | OUTPATIENT
Start: 2019-11-20 | End: 2019-11-20 | Stop reason: ALTCHOICE

## 2019-11-20 RX ORDER — CYCLOBENZAPRINE HCL 10 MG
10 TABLET ORAL 3 TIMES DAILY PRN
Status: DISCONTINUED | OUTPATIENT
Start: 2019-11-20 | End: 2019-11-24 | Stop reason: HOSPADM

## 2019-11-20 RX ORDER — PHENYLEPHRINE HYDROCHLORIDE 10 MG/ML
INJECTION INTRAVENOUS PRN
Status: DISCONTINUED | OUTPATIENT
Start: 2019-11-20 | End: 2019-11-20 | Stop reason: SDUPTHER

## 2019-11-20 RX ORDER — SODIUM CHLORIDE 9 MG/ML
INJECTION, SOLUTION INTRAVENOUS CONTINUOUS
Status: DISCONTINUED | OUTPATIENT
Start: 2019-11-20 | End: 2019-11-20

## 2019-11-20 RX ORDER — SODIUM CHLORIDE 9 MG/ML
INJECTION, SOLUTION INTRAVENOUS CONTINUOUS PRN
Status: DISCONTINUED | OUTPATIENT
Start: 2019-11-20 | End: 2019-11-20 | Stop reason: SDUPTHER

## 2019-11-20 RX ORDER — SODIUM CHLORIDE 0.9 % (FLUSH) 0.9 %
10 SYRINGE (ML) INJECTION PRN
Status: DISCONTINUED | OUTPATIENT
Start: 2019-11-20 | End: 2019-11-20 | Stop reason: HOSPADM

## 2019-11-20 RX ORDER — MORPHINE SULFATE 4 MG/ML
4 INJECTION, SOLUTION INTRAMUSCULAR; INTRAVENOUS
Status: DISCONTINUED | OUTPATIENT
Start: 2019-11-20 | End: 2019-11-24 | Stop reason: HOSPADM

## 2019-11-20 RX ORDER — SODIUM CHLORIDE 0.9 % (FLUSH) 0.9 %
10 SYRINGE (ML) INJECTION PRN
Status: DISCONTINUED | OUTPATIENT
Start: 2019-11-20 | End: 2019-11-24 | Stop reason: HOSPADM

## 2019-11-20 RX ORDER — VANCOMYCIN HYDROCHLORIDE 500 MG/10ML
INJECTION, POWDER, LYOPHILIZED, FOR SOLUTION INTRAVENOUS PRN
Status: DISCONTINUED | OUTPATIENT
Start: 2019-11-20 | End: 2019-11-20 | Stop reason: ALTCHOICE

## 2019-11-20 RX ORDER — PROPOFOL 10 MG/ML
INJECTION, EMULSION INTRAVENOUS PRN
Status: DISCONTINUED | OUTPATIENT
Start: 2019-11-20 | End: 2019-11-20 | Stop reason: SDUPTHER

## 2019-11-20 RX ORDER — DULOXETIN HYDROCHLORIDE 60 MG/1
60 CAPSULE, DELAYED RELEASE ORAL DAILY
Status: DISCONTINUED | OUTPATIENT
Start: 2019-11-21 | End: 2019-11-24 | Stop reason: HOSPADM

## 2019-11-20 RX ORDER — LIDOCAINE HYDROCHLORIDE 20 MG/ML
INJECTION, SOLUTION INTRAVENOUS PRN
Status: DISCONTINUED | OUTPATIENT
Start: 2019-11-20 | End: 2019-11-20 | Stop reason: SDUPTHER

## 2019-11-20 RX ORDER — NEOSTIGMINE METHYLSULFATE 1 MG/ML
INJECTION, SOLUTION INTRAVENOUS PRN
Status: DISCONTINUED | OUTPATIENT
Start: 2019-11-20 | End: 2019-11-20 | Stop reason: SDUPTHER

## 2019-11-20 RX ORDER — VECURONIUM BROMIDE 1 MG/ML
INJECTION, POWDER, LYOPHILIZED, FOR SOLUTION INTRAVENOUS PRN
Status: DISCONTINUED | OUTPATIENT
Start: 2019-11-20 | End: 2019-11-20 | Stop reason: SDUPTHER

## 2019-11-20 RX ORDER — SODIUM PHOSPHATE, DIBASIC AND SODIUM PHOSPHATE, MONOBASIC 7; 19 G/133ML; G/133ML
1 ENEMA RECTAL DAILY PRN
Status: DISCONTINUED | OUTPATIENT
Start: 2019-11-20 | End: 2019-11-24 | Stop reason: HOSPADM

## 2019-11-20 RX ORDER — PREGABALIN 100 MG/1
200 CAPSULE ORAL 2 TIMES DAILY
Status: DISCONTINUED | OUTPATIENT
Start: 2019-11-20 | End: 2019-11-24 | Stop reason: HOSPADM

## 2019-11-20 RX ORDER — ONDANSETRON 2 MG/ML
4 INJECTION INTRAMUSCULAR; INTRAVENOUS EVERY 6 HOURS PRN
Status: DISCONTINUED | OUTPATIENT
Start: 2019-11-20 | End: 2019-11-24 | Stop reason: HOSPADM

## 2019-11-20 RX ORDER — SODIUM CHLORIDE 0.9 % (FLUSH) 0.9 %
10 SYRINGE (ML) INJECTION EVERY 12 HOURS SCHEDULED
Status: DISCONTINUED | OUTPATIENT
Start: 2019-11-20 | End: 2019-11-20 | Stop reason: HOSPADM

## 2019-11-20 RX ORDER — AMITRIPTYLINE HYDROCHLORIDE 25 MG/1
25 TABLET, FILM COATED ORAL NIGHTLY
Status: DISCONTINUED | OUTPATIENT
Start: 2019-11-20 | End: 2019-11-24 | Stop reason: HOSPADM

## 2019-11-20 RX ORDER — FENTANYL CITRATE 50 UG/ML
INJECTION, SOLUTION INTRAMUSCULAR; INTRAVENOUS PRN
Status: DISCONTINUED | OUTPATIENT
Start: 2019-11-20 | End: 2019-11-20 | Stop reason: SDUPTHER

## 2019-11-20 RX ORDER — ALBUMIN, HUMAN INJ 5% 5 %
SOLUTION INTRAVENOUS PRN
Status: DISCONTINUED | OUTPATIENT
Start: 2019-11-20 | End: 2019-11-20 | Stop reason: SDUPTHER

## 2019-11-20 RX ORDER — SENNA AND DOCUSATE SODIUM 50; 8.6 MG/1; MG/1
1 TABLET, FILM COATED ORAL 2 TIMES DAILY
Status: DISCONTINUED | OUTPATIENT
Start: 2019-11-20 | End: 2019-11-24 | Stop reason: HOSPADM

## 2019-11-20 RX ORDER — DEXAMETHASONE SODIUM PHOSPHATE 10 MG/ML
INJECTION INTRAMUSCULAR; INTRAVENOUS PRN
Status: DISCONTINUED | OUTPATIENT
Start: 2019-11-20 | End: 2019-11-20 | Stop reason: SDUPTHER

## 2019-11-20 RX ORDER — LIDOCAINE HYDROCHLORIDE AND EPINEPHRINE 5; 5 MG/ML; UG/ML
INJECTION, SOLUTION INFILTRATION; PERINEURAL PRN
Status: DISCONTINUED | OUTPATIENT
Start: 2019-11-20 | End: 2019-11-20 | Stop reason: ALTCHOICE

## 2019-11-20 RX ORDER — MORPHINE SULFATE 2 MG/ML
2 INJECTION, SOLUTION INTRAMUSCULAR; INTRAVENOUS
Status: DISCONTINUED | OUTPATIENT
Start: 2019-11-20 | End: 2019-11-24 | Stop reason: HOSPADM

## 2019-11-20 RX ORDER — GLYCOPYRROLATE 1 MG/5 ML
SYRINGE (ML) INTRAVENOUS PRN
Status: DISCONTINUED | OUTPATIENT
Start: 2019-11-20 | End: 2019-11-20 | Stop reason: SDUPTHER

## 2019-11-20 RX ORDER — PRAVASTATIN SODIUM 20 MG
40 TABLET ORAL DAILY
Status: DISCONTINUED | OUTPATIENT
Start: 2019-11-20 | End: 2019-11-24 | Stop reason: HOSPADM

## 2019-11-20 RX ORDER — OXYCODONE HYDROCHLORIDE 5 MG/1
5 TABLET ORAL EVERY 4 HOURS PRN
Status: DISCONTINUED | OUTPATIENT
Start: 2019-11-20 | End: 2019-11-24 | Stop reason: HOSPADM

## 2019-11-20 RX ORDER — MIDAZOLAM HYDROCHLORIDE 1 MG/ML
INJECTION INTRAMUSCULAR; INTRAVENOUS PRN
Status: DISCONTINUED | OUTPATIENT
Start: 2019-11-20 | End: 2019-11-20 | Stop reason: SDUPTHER

## 2019-11-20 RX ORDER — EPHEDRINE SULFATE/0.9% NACL/PF 50 MG/5 ML
SYRINGE (ML) INTRAVENOUS PRN
Status: DISCONTINUED | OUTPATIENT
Start: 2019-11-20 | End: 2019-11-20 | Stop reason: SDUPTHER

## 2019-11-20 RX ORDER — SODIUM CHLORIDE 0.9 % (FLUSH) 0.9 %
10 SYRINGE (ML) INJECTION EVERY 12 HOURS SCHEDULED
Status: DISCONTINUED | OUTPATIENT
Start: 2019-11-20 | End: 2019-11-24 | Stop reason: HOSPADM

## 2019-11-20 RX ORDER — OXYCODONE HYDROCHLORIDE 10 MG/1
10 TABLET ORAL EVERY 4 HOURS PRN
Status: DISCONTINUED | OUTPATIENT
Start: 2019-11-20 | End: 2019-11-24 | Stop reason: HOSPADM

## 2019-11-20 RX ORDER — DOCUSATE SODIUM 100 MG/1
100 CAPSULE, LIQUID FILLED ORAL 2 TIMES DAILY
Status: DISCONTINUED | OUTPATIENT
Start: 2019-11-20 | End: 2019-11-24 | Stop reason: HOSPADM

## 2019-11-20 RX ADMIN — Medication 0.6 MG: at 09:28

## 2019-11-20 RX ADMIN — PHENYLEPHRINE HYDROCHLORIDE 100 MCG: 10 INJECTION INTRAVENOUS at 07:07

## 2019-11-20 RX ADMIN — CYCLOBENZAPRINE 10 MG: 10 TABLET, FILM COATED ORAL at 14:59

## 2019-11-20 RX ADMIN — FENTANYL CITRATE 100 MCG: 50 INJECTION, SOLUTION INTRAMUSCULAR; INTRAVENOUS at 06:34

## 2019-11-20 RX ADMIN — DOCUSATE SODIUM 100 MG: 100 CAPSULE, LIQUID FILLED ORAL at 21:18

## 2019-11-20 RX ADMIN — DOCUSATE SODIUM 100 MG: 100 CAPSULE, LIQUID FILLED ORAL at 14:53

## 2019-11-20 RX ADMIN — PROPOFOL 160 MG: 10 INJECTION, EMULSION INTRAVENOUS at 06:34

## 2019-11-20 RX ADMIN — OXYCODONE HYDROCHLORIDE 10 MG: 10 TABLET ORAL at 21:18

## 2019-11-20 RX ADMIN — SODIUM CHLORIDE: 9 INJECTION, SOLUTION INTRAVENOUS at 11:36

## 2019-11-20 RX ADMIN — VECURONIUM BROMIDE FOR INJECTION 7 MG: 1 INJECTION, POWDER, LYOPHILIZED, FOR SOLUTION INTRAVENOUS at 06:34

## 2019-11-20 RX ADMIN — SODIUM CHLORIDE: 0.9 INJECTION, SOLUTION INTRAVENOUS at 06:37

## 2019-11-20 RX ADMIN — SODIUM CHLORIDE: 9 INJECTION, SOLUTION INTRAVENOUS at 05:55

## 2019-11-20 RX ADMIN — MIDAZOLAM 2 MG: 1 INJECTION INTRAMUSCULAR; INTRAVENOUS at 06:31

## 2019-11-20 RX ADMIN — BISACODYL 5 MG: 5 TABLET, COATED ORAL at 13:51

## 2019-11-20 RX ADMIN — PHENYLEPHRINE HYDROCHLORIDE 100 MCG: 10 INJECTION INTRAVENOUS at 07:34

## 2019-11-20 RX ADMIN — PHENYLEPHRINE HYDROCHLORIDE 100 MCG: 10 INJECTION INTRAVENOUS at 07:46

## 2019-11-20 RX ADMIN — PREGABALIN 200 MG: 100 CAPSULE ORAL at 21:18

## 2019-11-20 RX ADMIN — SODIUM CHLORIDE: 9 INJECTION, SOLUTION INTRAVENOUS at 06:31

## 2019-11-20 RX ADMIN — SODIUM CHLORIDE: 0.9 INJECTION, SOLUTION INTRAVENOUS at 08:24

## 2019-11-20 RX ADMIN — Medication 3 MG: at 09:28

## 2019-11-20 RX ADMIN — ALBUMIN (HUMAN) 25 G: 12.5 INJECTION, SOLUTION INTRAVENOUS at 08:02

## 2019-11-20 RX ADMIN — Medication 10 MG: at 07:44

## 2019-11-20 RX ADMIN — SODIUM CHLORIDE, PRESERVATIVE FREE 10 ML: 5 INJECTION INTRAVENOUS at 13:52

## 2019-11-20 RX ADMIN — MORPHINE SULFATE 4 MG: 4 INJECTION, SOLUTION INTRAMUSCULAR; INTRAVENOUS at 13:51

## 2019-11-20 RX ADMIN — SENNOSIDES AND DOCUSATE SODIUM 1 TABLET: 8.6; 5 TABLET ORAL at 21:18

## 2019-11-20 RX ADMIN — SENNOSIDES AND DOCUSATE SODIUM 1 TABLET: 8.6; 5 TABLET ORAL at 14:53

## 2019-11-20 RX ADMIN — FENTANYL CITRATE 50 MCG: 50 INJECTION, SOLUTION INTRAMUSCULAR; INTRAVENOUS at 08:44

## 2019-11-20 RX ADMIN — HYDROMORPHONE HYDROCHLORIDE 0.25 MG: 1 INJECTION, SOLUTION INTRAMUSCULAR; INTRAVENOUS; SUBCUTANEOUS at 11:34

## 2019-11-20 RX ADMIN — LIDOCAINE HYDROCHLORIDE 60 MG: 20 INJECTION, SOLUTION INTRAVENOUS at 06:34

## 2019-11-20 RX ADMIN — PRAVASTATIN SODIUM 40 MG: 20 TABLET ORAL at 14:59

## 2019-11-20 RX ADMIN — Medication 10 MG: at 07:46

## 2019-11-20 RX ADMIN — Medication 10 MG: at 07:55

## 2019-11-20 RX ADMIN — AMITRIPTYLINE HYDROCHLORIDE 25 MG: 25 TABLET, FILM COATED ORAL at 21:18

## 2019-11-20 RX ADMIN — VANCOMYCIN HYDROCHLORIDE 1 G: 1 INJECTION, POWDER, LYOPHILIZED, FOR SOLUTION INTRAVENOUS at 06:35

## 2019-11-20 RX ADMIN — VANCOMYCIN HYDROCHLORIDE 1000 MG: 1 INJECTION, POWDER, LYOPHILIZED, FOR SOLUTION INTRAVENOUS at 06:03

## 2019-11-20 RX ADMIN — HYDROMORPHONE HYDROCHLORIDE 0.5 MG: 1 INJECTION, SOLUTION INTRAMUSCULAR; INTRAVENOUS; SUBCUTANEOUS at 11:15

## 2019-11-20 RX ADMIN — OXYCODONE HYDROCHLORIDE 5 MG: 5 TABLET ORAL at 14:59

## 2019-11-20 RX ADMIN — DEXAMETHASONE SODIUM PHOSPHATE 10 MG: 10 INJECTION INTRAMUSCULAR; INTRAVENOUS at 06:34

## 2019-11-20 RX ADMIN — Medication 10 MG: at 07:40

## 2019-11-20 ASSESSMENT — PULMONARY FUNCTION TESTS
PIF_VALUE: 24
PIF_VALUE: 24
PIF_VALUE: 25
PIF_VALUE: 10
PIF_VALUE: 25
PIF_VALUE: 24
PIF_VALUE: 26
PIF_VALUE: 21
PIF_VALUE: 23
PIF_VALUE: 26
PIF_VALUE: 0
PIF_VALUE: 26
PIF_VALUE: 25
PIF_VALUE: 23
PIF_VALUE: 10
PIF_VALUE: 25
PIF_VALUE: 25
PIF_VALUE: 24
PIF_VALUE: 0
PIF_VALUE: 23
PIF_VALUE: 25
PIF_VALUE: 23
PIF_VALUE: 22
PIF_VALUE: 23
PIF_VALUE: 25
PIF_VALUE: 25
PIF_VALUE: 27
PIF_VALUE: 26
PIF_VALUE: 24
PIF_VALUE: 26
PIF_VALUE: 25
PIF_VALUE: 10
PIF_VALUE: 25
PIF_VALUE: 25
PIF_VALUE: 24
PIF_VALUE: 26
PIF_VALUE: 24
PIF_VALUE: 23
PIF_VALUE: 25
PIF_VALUE: 24
PIF_VALUE: 9
PIF_VALUE: 25
PIF_VALUE: 0
PIF_VALUE: 25
PIF_VALUE: 25
PIF_VALUE: 24
PIF_VALUE: 23
PIF_VALUE: 24
PIF_VALUE: 25
PIF_VALUE: 24
PIF_VALUE: 25
PIF_VALUE: 25
PIF_VALUE: 24
PIF_VALUE: 27
PIF_VALUE: 15
PIF_VALUE: 25
PIF_VALUE: 23
PIF_VALUE: 23
PIF_VALUE: 25
PIF_VALUE: 1
PIF_VALUE: 26
PIF_VALUE: 25
PIF_VALUE: 24
PIF_VALUE: 25
PIF_VALUE: 26
PIF_VALUE: 25
PIF_VALUE: 24
PIF_VALUE: 25
PIF_VALUE: 23
PIF_VALUE: 23
PIF_VALUE: 30
PIF_VALUE: 26
PIF_VALUE: 6
PIF_VALUE: 25
PIF_VALUE: 24
PIF_VALUE: 5
PIF_VALUE: 24
PIF_VALUE: 27
PIF_VALUE: 22
PIF_VALUE: 24
PIF_VALUE: 21
PIF_VALUE: 24
PIF_VALUE: 24
PIF_VALUE: 25
PIF_VALUE: 9
PIF_VALUE: 25
PIF_VALUE: 24
PIF_VALUE: 23
PIF_VALUE: 22
PIF_VALUE: 8
PIF_VALUE: 25
PIF_VALUE: 26
PIF_VALUE: 24
PIF_VALUE: 23
PIF_VALUE: 26
PIF_VALUE: 23
PIF_VALUE: 24
PIF_VALUE: 24
PIF_VALUE: 23
PIF_VALUE: 25
PIF_VALUE: 23
PIF_VALUE: 22
PIF_VALUE: 23
PIF_VALUE: 25
PIF_VALUE: 1
PIF_VALUE: 24
PIF_VALUE: 25
PIF_VALUE: 15
PIF_VALUE: 25
PIF_VALUE: 24
PIF_VALUE: 25
PIF_VALUE: 25
PIF_VALUE: 8
PIF_VALUE: 25
PIF_VALUE: 24
PIF_VALUE: 25
PIF_VALUE: 25
PIF_VALUE: 7
PIF_VALUE: 25
PIF_VALUE: 22
PIF_VALUE: 25
PIF_VALUE: 4
PIF_VALUE: 24
PIF_VALUE: 25
PIF_VALUE: 24
PIF_VALUE: 26
PIF_VALUE: 9
PIF_VALUE: 26
PIF_VALUE: 24
PIF_VALUE: 26
PIF_VALUE: 26
PIF_VALUE: 22
PIF_VALUE: 26
PIF_VALUE: 25
PIF_VALUE: 24
PIF_VALUE: 5
PIF_VALUE: 22
PIF_VALUE: 25
PIF_VALUE: 24
PIF_VALUE: 26
PIF_VALUE: 25
PIF_VALUE: 26
PIF_VALUE: 24
PIF_VALUE: 26
PIF_VALUE: 25
PIF_VALUE: 23
PIF_VALUE: 24
PIF_VALUE: 4
PIF_VALUE: 25
PIF_VALUE: 15
PIF_VALUE: 26
PIF_VALUE: 26
PIF_VALUE: 24
PIF_VALUE: 26
PIF_VALUE: 24
PIF_VALUE: 25
PIF_VALUE: 3
PIF_VALUE: 21
PIF_VALUE: 24
PIF_VALUE: 9
PIF_VALUE: 23
PIF_VALUE: 23
PIF_VALUE: 24
PIF_VALUE: 26
PIF_VALUE: 9
PIF_VALUE: 1
PIF_VALUE: 25
PIF_VALUE: 25
PIF_VALUE: 24
PIF_VALUE: 25
PIF_VALUE: 25
PIF_VALUE: 24
PIF_VALUE: 24
PIF_VALUE: 21
PIF_VALUE: 25
PIF_VALUE: 25
PIF_VALUE: 24
PIF_VALUE: 25
PIF_VALUE: 26
PIF_VALUE: 21
PIF_VALUE: 24
PIF_VALUE: 26
PIF_VALUE: 25
PIF_VALUE: 26
PIF_VALUE: 24
PIF_VALUE: 25
PIF_VALUE: 24
PIF_VALUE: 26
PIF_VALUE: 26
PIF_VALUE: 25
PIF_VALUE: 24
PIF_VALUE: 15
PIF_VALUE: 24
PIF_VALUE: 26
PIF_VALUE: 25
PIF_VALUE: 15
PIF_VALUE: 24
PIF_VALUE: 24
PIF_VALUE: 26
PIF_VALUE: 26
PIF_VALUE: 24
PIF_VALUE: 26
PIF_VALUE: 24
PIF_VALUE: 25
PIF_VALUE: 26
PIF_VALUE: 30
PIF_VALUE: 21

## 2019-11-20 ASSESSMENT — PAIN DESCRIPTION - LOCATION
LOCATION: BACK

## 2019-11-20 ASSESSMENT — PAIN SCALES - GENERAL
PAINLEVEL_OUTOF10: 5
PAINLEVEL_OUTOF10: 6
PAINLEVEL_OUTOF10: 8
PAINLEVEL_OUTOF10: 2
PAINLEVEL_OUTOF10: 10
PAINLEVEL_OUTOF10: 5
PAINLEVEL_OUTOF10: 8

## 2019-11-20 ASSESSMENT — PAIN DESCRIPTION - PROGRESSION: CLINICAL_PROGRESSION: GRADUALLY IMPROVING

## 2019-11-20 ASSESSMENT — PAIN DESCRIPTION - FREQUENCY
FREQUENCY: INTERMITTENT

## 2019-11-20 ASSESSMENT — PAIN - FUNCTIONAL ASSESSMENT
PAIN_FUNCTIONAL_ASSESSMENT: PREVENTS OR INTERFERES SOME ACTIVE ACTIVITIES AND ADLS
PAIN_FUNCTIONAL_ASSESSMENT: 0-10
PAIN_FUNCTIONAL_ASSESSMENT: PREVENTS OR INTERFERES SOME ACTIVE ACTIVITIES AND ADLS

## 2019-11-20 ASSESSMENT — PAIN DESCRIPTION - ORIENTATION
ORIENTATION: LOWER;MID
ORIENTATION: RIGHT;LEFT
ORIENTATION: LOWER;MID

## 2019-11-20 ASSESSMENT — PAIN DESCRIPTION - DESCRIPTORS
DESCRIPTORS: RADIATING;THROBBING;ACHING
DESCRIPTORS: ACHING;PRESSURE
DESCRIPTORS: RADIATING;ACHING;THROBBING
DESCRIPTORS: ACHING;SHARP
DESCRIPTORS: PRESSURE
DESCRIPTORS: DISCOMFORT;DULL;SORE
DESCRIPTORS: PATIENT UNABLE TO DESCRIBE

## 2019-11-20 ASSESSMENT — PAIN DESCRIPTION - ONSET
ONSET: ON-GOING
ONSET: PROGRESSIVE
ONSET: AWAKENED FROM SLEEP

## 2019-11-20 ASSESSMENT — PAIN DESCRIPTION - PAIN TYPE
TYPE: SURGICAL PAIN
TYPE: SURGICAL PAIN
TYPE: SURGICAL PAIN;ACUTE PAIN
TYPE: SURGICAL PAIN

## 2019-11-20 ASSESSMENT — PAIN SCALES - WONG BAKER: WONGBAKER_NUMERICALRESPONSE: 4

## 2019-11-21 PROCEDURE — 6370000000 HC RX 637 (ALT 250 FOR IP): Performed by: NEUROLOGICAL SURGERY

## 2019-11-21 PROCEDURE — 6360000002 HC RX W HCPCS: Performed by: NEUROLOGICAL SURGERY

## 2019-11-21 PROCEDURE — 2700000000 HC OXYGEN THERAPY PER DAY

## 2019-11-21 PROCEDURE — 2580000003 HC RX 258: Performed by: NEUROLOGICAL SURGERY

## 2019-11-21 PROCEDURE — 97530 THERAPEUTIC ACTIVITIES: CPT

## 2019-11-21 PROCEDURE — 1200000000 HC SEMI PRIVATE

## 2019-11-21 PROCEDURE — 97161 PT EVAL LOW COMPLEX 20 MIN: CPT

## 2019-11-21 PROCEDURE — 97535 SELF CARE MNGMENT TRAINING: CPT

## 2019-11-21 PROCEDURE — 97165 OT EVAL LOW COMPLEX 30 MIN: CPT

## 2019-11-21 RX ADMIN — DOCUSATE SODIUM 100 MG: 100 CAPSULE, LIQUID FILLED ORAL at 08:58

## 2019-11-21 RX ADMIN — SENNOSIDES AND DOCUSATE SODIUM 1 TABLET: 8.6; 5 TABLET ORAL at 08:59

## 2019-11-21 RX ADMIN — OXYCODONE HYDROCHLORIDE 10 MG: 10 TABLET ORAL at 16:07

## 2019-11-21 RX ADMIN — MAGNESIUM HYDROXIDE 30 ML: 400 SUSPENSION ORAL at 22:28

## 2019-11-21 RX ADMIN — OXYCODONE HYDROCHLORIDE 10 MG: 10 TABLET ORAL at 20:26

## 2019-11-21 RX ADMIN — AMITRIPTYLINE HYDROCHLORIDE 25 MG: 25 TABLET, FILM COATED ORAL at 21:14

## 2019-11-21 RX ADMIN — PREGABALIN 200 MG: 100 CAPSULE ORAL at 08:58

## 2019-11-21 RX ADMIN — OXYCODONE HYDROCHLORIDE 10 MG: 10 TABLET ORAL at 06:35

## 2019-11-21 RX ADMIN — BISACODYL 5 MG: 5 TABLET, COATED ORAL at 08:58

## 2019-11-21 RX ADMIN — SODIUM CHLORIDE, PRESERVATIVE FREE 10 ML: 5 INJECTION INTRAVENOUS at 09:03

## 2019-11-21 RX ADMIN — PREGABALIN 200 MG: 100 CAPSULE ORAL at 21:14

## 2019-11-21 RX ADMIN — MORPHINE SULFATE 4 MG: 4 INJECTION, SOLUTION INTRAMUSCULAR; INTRAVENOUS at 14:26

## 2019-11-21 RX ADMIN — PRAVASTATIN SODIUM 40 MG: 20 TABLET ORAL at 08:58

## 2019-11-21 RX ADMIN — SENNOSIDES AND DOCUSATE SODIUM 1 TABLET: 8.6; 5 TABLET ORAL at 21:14

## 2019-11-21 RX ADMIN — DULOXETINE HYDROCHLORIDE 60 MG: 60 CAPSULE, DELAYED RELEASE ORAL at 08:58

## 2019-11-21 RX ADMIN — MORPHINE SULFATE 4 MG: 4 INJECTION, SOLUTION INTRAMUSCULAR; INTRAVENOUS at 10:00

## 2019-11-21 RX ADMIN — SODIUM CHLORIDE, PRESERVATIVE FREE 10 ML: 5 INJECTION INTRAVENOUS at 21:15

## 2019-11-21 RX ADMIN — DOCUSATE SODIUM 100 MG: 100 CAPSULE, LIQUID FILLED ORAL at 21:14

## 2019-11-21 RX ADMIN — CYCLOBENZAPRINE 10 MG: 10 TABLET, FILM COATED ORAL at 06:38

## 2019-11-21 RX ADMIN — VANCOMYCIN HYDROCHLORIDE 1000 MG: 1 INJECTION, POWDER, LYOPHILIZED, FOR SOLUTION INTRAVENOUS at 06:35

## 2019-11-21 ASSESSMENT — PAIN DESCRIPTION - FREQUENCY
FREQUENCY: INTERMITTENT
FREQUENCY: CONTINUOUS

## 2019-11-21 ASSESSMENT — PAIN SCALES - GENERAL
PAINLEVEL_OUTOF10: 5
PAINLEVEL_OUTOF10: 10
PAINLEVEL_OUTOF10: 0
PAINLEVEL_OUTOF10: 5
PAINLEVEL_OUTOF10: 4
PAINLEVEL_OUTOF10: 7
PAINLEVEL_OUTOF10: 8
PAINLEVEL_OUTOF10: 8
PAINLEVEL_OUTOF10: 4
PAINLEVEL_OUTOF10: 10
PAINLEVEL_OUTOF10: 6

## 2019-11-21 ASSESSMENT — PAIN - FUNCTIONAL ASSESSMENT
PAIN_FUNCTIONAL_ASSESSMENT: PREVENTS OR INTERFERES SOME ACTIVE ACTIVITIES AND ADLS
PAIN_FUNCTIONAL_ASSESSMENT: PREVENTS OR INTERFERES SOME ACTIVE ACTIVITIES AND ADLS

## 2019-11-21 ASSESSMENT — PAIN DESCRIPTION - PAIN TYPE
TYPE: SURGICAL PAIN

## 2019-11-21 ASSESSMENT — PAIN DESCRIPTION - DESCRIPTORS
DESCRIPTORS: ACHING;CONSTANT;DULL;DISCOMFORT
DESCRIPTORS: ACHING;CRAMPING;SORE
DESCRIPTORS: ACHING;CONSTANT;SORE;TENDER
DESCRIPTORS: ACHING;CONSTANT;SORE
DESCRIPTORS: ACHING;CONSTANT;SORE

## 2019-11-21 ASSESSMENT — PAIN DESCRIPTION - LOCATION
LOCATION: BACK

## 2019-11-21 ASSESSMENT — PAIN DESCRIPTION - PROGRESSION
CLINICAL_PROGRESSION: GRADUALLY IMPROVING

## 2019-11-21 ASSESSMENT — PAIN DESCRIPTION - ORIENTATION
ORIENTATION: LOWER;MID
ORIENTATION: LOWER;MID;POSTERIOR

## 2019-11-21 ASSESSMENT — PAIN DESCRIPTION - ONSET
ONSET: ON-GOING

## 2019-11-22 PROCEDURE — 97535 SELF CARE MNGMENT TRAINING: CPT

## 2019-11-22 PROCEDURE — 1200000000 HC SEMI PRIVATE

## 2019-11-22 PROCEDURE — 6370000000 HC RX 637 (ALT 250 FOR IP): Performed by: NEUROLOGICAL SURGERY

## 2019-11-22 PROCEDURE — 97530 THERAPEUTIC ACTIVITIES: CPT

## 2019-11-22 PROCEDURE — 6360000002 HC RX W HCPCS: Performed by: NEUROLOGICAL SURGERY

## 2019-11-22 PROCEDURE — 2580000003 HC RX 258: Performed by: NEUROLOGICAL SURGERY

## 2019-11-22 RX ADMIN — SENNOSIDES AND DOCUSATE SODIUM 1 TABLET: 8.6; 5 TABLET ORAL at 20:43

## 2019-11-22 RX ADMIN — SODIUM CHLORIDE, PRESERVATIVE FREE 10 ML: 5 INJECTION INTRAVENOUS at 09:55

## 2019-11-22 RX ADMIN — CYCLOBENZAPRINE 10 MG: 10 TABLET, FILM COATED ORAL at 09:54

## 2019-11-22 RX ADMIN — BISACODYL 5 MG: 5 TABLET, COATED ORAL at 09:54

## 2019-11-22 RX ADMIN — PREGABALIN 200 MG: 100 CAPSULE ORAL at 09:54

## 2019-11-22 RX ADMIN — SODIUM CHLORIDE, PRESERVATIVE FREE 10 ML: 5 INJECTION INTRAVENOUS at 20:47

## 2019-11-22 RX ADMIN — SENNOSIDES AND DOCUSATE SODIUM 1 TABLET: 8.6; 5 TABLET ORAL at 09:54

## 2019-11-22 RX ADMIN — OXYCODONE HYDROCHLORIDE 10 MG: 10 TABLET ORAL at 20:42

## 2019-11-22 RX ADMIN — DOCUSATE SODIUM 100 MG: 100 CAPSULE, LIQUID FILLED ORAL at 20:43

## 2019-11-22 RX ADMIN — MORPHINE SULFATE 2 MG: 2 INJECTION, SOLUTION INTRAMUSCULAR; INTRAVENOUS at 02:31

## 2019-11-22 RX ADMIN — OXYCODONE HYDROCHLORIDE 10 MG: 10 TABLET ORAL at 04:01

## 2019-11-22 RX ADMIN — PRAVASTATIN SODIUM 40 MG: 20 TABLET ORAL at 09:54

## 2019-11-22 RX ADMIN — OXYCODONE HYDROCHLORIDE 10 MG: 10 TABLET ORAL at 09:54

## 2019-11-22 RX ADMIN — AMITRIPTYLINE HYDROCHLORIDE 25 MG: 25 TABLET, FILM COATED ORAL at 20:43

## 2019-11-22 RX ADMIN — OXYCODONE HYDROCHLORIDE 10 MG: 10 TABLET ORAL at 14:25

## 2019-11-22 RX ADMIN — PREGABALIN 200 MG: 100 CAPSULE ORAL at 20:46

## 2019-11-22 RX ADMIN — VANCOMYCIN HYDROCHLORIDE 1000 MG: 1 INJECTION, POWDER, LYOPHILIZED, FOR SOLUTION INTRAVENOUS at 05:44

## 2019-11-22 RX ADMIN — DOCUSATE SODIUM 100 MG: 100 CAPSULE, LIQUID FILLED ORAL at 09:54

## 2019-11-22 RX ADMIN — DULOXETINE HYDROCHLORIDE 60 MG: 60 CAPSULE, DELAYED RELEASE ORAL at 09:54

## 2019-11-22 ASSESSMENT — PAIN SCALES - GENERAL
PAINLEVEL_OUTOF10: 7
PAINLEVEL_OUTOF10: 0
PAINLEVEL_OUTOF10: 0
PAINLEVEL_OUTOF10: 7
PAINLEVEL_OUTOF10: 7
PAINLEVEL_OUTOF10: 10

## 2019-11-22 ASSESSMENT — PAIN DESCRIPTION - DESCRIPTORS
DESCRIPTORS: ACHING;DISCOMFORT;SORE
DESCRIPTORS: ACHING;PENETRATING;SORE
DESCRIPTORS: ACHING;DISCOMFORT;SORE
DESCRIPTORS: ACHING;DISCOMFORT;DULL

## 2019-11-22 ASSESSMENT — PAIN DESCRIPTION - ORIENTATION
ORIENTATION: MID;LOWER
ORIENTATION: MID;LOWER
ORIENTATION: UPPER
ORIENTATION: UPPER
ORIENTATION: MID;LOWER

## 2019-11-22 ASSESSMENT — PAIN DESCRIPTION - LOCATION
LOCATION: BACK

## 2019-11-22 ASSESSMENT — PAIN DESCRIPTION - ONSET
ONSET: AWAKENED FROM SLEEP
ONSET: ON-GOING
ONSET: ON-GOING

## 2019-11-22 ASSESSMENT — PAIN DESCRIPTION - PAIN TYPE
TYPE: SURGICAL PAIN
TYPE: SURGICAL PAIN
TYPE: ACUTE PAIN;SURGICAL PAIN
TYPE: SURGICAL PAIN
TYPE: SURGICAL PAIN

## 2019-11-22 ASSESSMENT — PAIN DESCRIPTION - FREQUENCY
FREQUENCY: CONTINUOUS
FREQUENCY: INTERMITTENT
FREQUENCY: CONTINUOUS

## 2019-11-22 ASSESSMENT — PAIN - FUNCTIONAL ASSESSMENT: PAIN_FUNCTIONAL_ASSESSMENT: PREVENTS OR INTERFERES SOME ACTIVE ACTIVITIES AND ADLS

## 2019-11-23 PROCEDURE — 6360000002 HC RX W HCPCS: Performed by: NEUROLOGICAL SURGERY

## 2019-11-23 PROCEDURE — 2580000003 HC RX 258: Performed by: NEUROLOGICAL SURGERY

## 2019-11-23 PROCEDURE — 1200000000 HC SEMI PRIVATE

## 2019-11-23 PROCEDURE — 6370000000 HC RX 637 (ALT 250 FOR IP): Performed by: NEUROLOGICAL SURGERY

## 2019-11-23 RX ORDER — CYCLOBENZAPRINE HCL 10 MG
10 TABLET ORAL 3 TIMES DAILY PRN
Qty: 90 TABLET | Refills: 2 | Status: SHIPPED | OUTPATIENT
Start: 2019-11-23 | End: 2019-12-23

## 2019-11-23 RX ORDER — PSEUDOEPHEDRINE HCL 30 MG
200 TABLET ORAL 2 TIMES DAILY
Qty: 120 CAPSULE | Refills: 2 | Status: SHIPPED | OUTPATIENT
Start: 2019-11-23 | End: 2020-11-27

## 2019-11-23 RX ORDER — OXYCODONE HYDROCHLORIDE 10 MG/1
10 TABLET ORAL EVERY 4 HOURS PRN
Qty: 42 TABLET | Refills: 0 | Status: SHIPPED | OUTPATIENT
Start: 2019-11-23 | End: 2019-11-30

## 2019-11-23 RX ORDER — PREGABALIN 200 MG/1
200 CAPSULE ORAL 2 TIMES DAILY
Qty: 60 CAPSULE | Refills: 3 | Status: SHIPPED | OUTPATIENT
Start: 2019-11-23 | End: 2020-11-27

## 2019-11-23 RX ADMIN — OXYCODONE HYDROCHLORIDE 10 MG: 10 TABLET ORAL at 20:10

## 2019-11-23 RX ADMIN — SODIUM CHLORIDE, PRESERVATIVE FREE 10 ML: 5 INJECTION INTRAVENOUS at 08:38

## 2019-11-23 RX ADMIN — PREGABALIN 200 MG: 100 CAPSULE ORAL at 20:10

## 2019-11-23 RX ADMIN — DOCUSATE SODIUM 100 MG: 100 CAPSULE, LIQUID FILLED ORAL at 20:10

## 2019-11-23 RX ADMIN — OXYCODONE HYDROCHLORIDE 10 MG: 10 TABLET ORAL at 01:29

## 2019-11-23 RX ADMIN — SENNOSIDES AND DOCUSATE SODIUM 1 TABLET: 8.6; 5 TABLET ORAL at 08:37

## 2019-11-23 RX ADMIN — OXYCODONE HYDROCHLORIDE 10 MG: 10 TABLET ORAL at 06:39

## 2019-11-23 RX ADMIN — CYCLOBENZAPRINE 10 MG: 10 TABLET, FILM COATED ORAL at 01:30

## 2019-11-23 RX ADMIN — CYCLOBENZAPRINE 10 MG: 10 TABLET, FILM COATED ORAL at 08:38

## 2019-11-23 RX ADMIN — SENNOSIDES AND DOCUSATE SODIUM 1 TABLET: 8.6; 5 TABLET ORAL at 20:10

## 2019-11-23 RX ADMIN — DOCUSATE SODIUM 100 MG: 100 CAPSULE, LIQUID FILLED ORAL at 08:39

## 2019-11-23 RX ADMIN — SODIUM CHLORIDE, PRESERVATIVE FREE 10 ML: 5 INJECTION INTRAVENOUS at 20:19

## 2019-11-23 RX ADMIN — BISACODYL 5 MG: 5 TABLET, COATED ORAL at 08:38

## 2019-11-23 RX ADMIN — PREGABALIN 200 MG: 100 CAPSULE ORAL at 08:38

## 2019-11-23 RX ADMIN — VANCOMYCIN HYDROCHLORIDE 1000 MG: 1 INJECTION, POWDER, LYOPHILIZED, FOR SOLUTION INTRAVENOUS at 06:30

## 2019-11-23 RX ADMIN — AMITRIPTYLINE HYDROCHLORIDE 25 MG: 25 TABLET, FILM COATED ORAL at 20:10

## 2019-11-23 RX ADMIN — DULOXETINE HYDROCHLORIDE 60 MG: 60 CAPSULE, DELAYED RELEASE ORAL at 08:38

## 2019-11-23 RX ADMIN — PRAVASTATIN SODIUM 40 MG: 20 TABLET ORAL at 08:38

## 2019-11-23 RX ADMIN — OXYCODONE HYDROCHLORIDE 10 MG: 10 TABLET ORAL at 12:07

## 2019-11-23 ASSESSMENT — PAIN DESCRIPTION - FREQUENCY
FREQUENCY: CONTINUOUS

## 2019-11-23 ASSESSMENT — PAIN DESCRIPTION - PAIN TYPE
TYPE: SURGICAL PAIN

## 2019-11-23 ASSESSMENT — PAIN - FUNCTIONAL ASSESSMENT
PAIN_FUNCTIONAL_ASSESSMENT: PREVENTS OR INTERFERES SOME ACTIVE ACTIVITIES AND ADLS

## 2019-11-23 ASSESSMENT — PAIN SCALES - GENERAL
PAINLEVEL_OUTOF10: 7
PAINLEVEL_OUTOF10: 4
PAINLEVEL_OUTOF10: 7
PAINLEVEL_OUTOF10: 0
PAINLEVEL_OUTOF10: 5
PAINLEVEL_OUTOF10: 5
PAINLEVEL_OUTOF10: 7
PAINLEVEL_OUTOF10: 7
PAINLEVEL_OUTOF10: 4
PAINLEVEL_OUTOF10: 3

## 2019-11-23 ASSESSMENT — PAIN DESCRIPTION - LOCATION
LOCATION: BACK

## 2019-11-23 ASSESSMENT — PAIN DESCRIPTION - ORIENTATION
ORIENTATION: LOWER

## 2019-11-23 ASSESSMENT — PAIN DESCRIPTION - ONSET
ONSET: ON-GOING

## 2019-11-23 ASSESSMENT — PAIN DESCRIPTION - DESCRIPTORS
DESCRIPTORS: ACHING;DISCOMFORT;DULL;SORE
DESCRIPTORS: ACHING;DISCOMFORT;DULL;SORE
DESCRIPTORS: ACHING;SORE;DISCOMFORT

## 2019-11-23 ASSESSMENT — PAIN SCALES - WONG BAKER: WONGBAKER_NUMERICALRESPONSE: 4

## 2019-11-23 ASSESSMENT — PAIN DESCRIPTION - PROGRESSION
CLINICAL_PROGRESSION: NOT CHANGED
CLINICAL_PROGRESSION: NOT CHANGED

## 2019-11-24 VITALS
HEIGHT: 61 IN | BODY MASS INDEX: 27.19 KG/M2 | HEART RATE: 102 BPM | OXYGEN SATURATION: 94 % | DIASTOLIC BLOOD PRESSURE: 53 MMHG | SYSTOLIC BLOOD PRESSURE: 111 MMHG | WEIGHT: 144 LBS | RESPIRATION RATE: 16 BRPM | TEMPERATURE: 99 F

## 2019-11-24 PROCEDURE — 2580000003 HC RX 258: Performed by: NEUROLOGICAL SURGERY

## 2019-11-24 PROCEDURE — 97110 THERAPEUTIC EXERCISES: CPT

## 2019-11-24 PROCEDURE — 97530 THERAPEUTIC ACTIVITIES: CPT

## 2019-11-24 PROCEDURE — 6370000000 HC RX 637 (ALT 250 FOR IP): Performed by: NEUROLOGICAL SURGERY

## 2019-11-24 RX ADMIN — BISACODYL 5 MG: 5 TABLET, COATED ORAL at 08:17

## 2019-11-24 RX ADMIN — SENNOSIDES AND DOCUSATE SODIUM 1 TABLET: 8.6; 5 TABLET ORAL at 08:17

## 2019-11-24 RX ADMIN — DULOXETINE HYDROCHLORIDE 60 MG: 60 CAPSULE, DELAYED RELEASE ORAL at 08:17

## 2019-11-24 RX ADMIN — PREGABALIN 200 MG: 100 CAPSULE ORAL at 08:16

## 2019-11-24 RX ADMIN — CYCLOBENZAPRINE 10 MG: 10 TABLET, FILM COATED ORAL at 08:17

## 2019-11-24 RX ADMIN — PRAVASTATIN SODIUM 40 MG: 20 TABLET ORAL at 08:17

## 2019-11-24 RX ADMIN — DOCUSATE SODIUM 100 MG: 100 CAPSULE, LIQUID FILLED ORAL at 08:17

## 2019-11-24 RX ADMIN — OXYCODONE HYDROCHLORIDE 10 MG: 10 TABLET ORAL at 05:23

## 2019-11-24 RX ADMIN — OXYCODONE HYDROCHLORIDE 10 MG: 10 TABLET ORAL at 10:32

## 2019-11-24 RX ADMIN — OXYCODONE HYDROCHLORIDE 10 MG: 10 TABLET ORAL at 00:26

## 2019-11-24 RX ADMIN — SODIUM CHLORIDE, PRESERVATIVE FREE 10 ML: 5 INJECTION INTRAVENOUS at 08:17

## 2019-11-24 ASSESSMENT — PAIN DESCRIPTION - DESCRIPTORS
DESCRIPTORS: ACHING;DISCOMFORT;SORE
DESCRIPTORS: ACHING;DISCOMFORT
DESCRIPTORS: ACHING;DISCOMFORT
DESCRIPTORS: ACHING

## 2019-11-24 ASSESSMENT — PAIN SCALES - GENERAL
PAINLEVEL_OUTOF10: 7
PAINLEVEL_OUTOF10: 5
PAINLEVEL_OUTOF10: 7
PAINLEVEL_OUTOF10: 0
PAINLEVEL_OUTOF10: 7

## 2019-11-24 ASSESSMENT — PAIN SCALES - WONG BAKER
WONGBAKER_NUMERICALRESPONSE: 0

## 2019-11-24 ASSESSMENT — PAIN DESCRIPTION - ORIENTATION
ORIENTATION: LOWER

## 2019-11-24 ASSESSMENT — PAIN DESCRIPTION - FREQUENCY
FREQUENCY: CONTINUOUS

## 2019-11-24 ASSESSMENT — PAIN DESCRIPTION - PAIN TYPE
TYPE: SURGICAL PAIN
TYPE: CHRONIC PAIN;ACUTE PAIN

## 2019-11-24 ASSESSMENT — PAIN DESCRIPTION - ONSET
ONSET: ON-GOING

## 2019-11-24 ASSESSMENT — PAIN DESCRIPTION - PROGRESSION
CLINICAL_PROGRESSION: GRADUALLY IMPROVING
CLINICAL_PROGRESSION: GRADUALLY IMPROVING
CLINICAL_PROGRESSION: OTHER (COMMENT)

## 2019-11-24 ASSESSMENT — PAIN DESCRIPTION - LOCATION
LOCATION: BACK

## 2019-12-02 ENCOUNTER — TELEPHONE (OUTPATIENT)
Dept: NEUROSURGERY | Age: 69
End: 2019-12-02

## 2019-12-02 ENCOUNTER — HOSPITAL ENCOUNTER (EMERGENCY)
Age: 69
Discharge: HOME | End: 2019-12-02
Attending: EMERGENCY MEDICINE
Payer: COMMERCIAL

## 2019-12-02 VITALS
WEIGHT: 144 LBS | SYSTOLIC BLOOD PRESSURE: 130 MMHG | BODY MASS INDEX: 28.27 KG/M2 | OXYGEN SATURATION: 96 % | HEIGHT: 60 IN | HEART RATE: 92 BPM | TEMPERATURE: 98.4 F | DIASTOLIC BLOOD PRESSURE: 76 MMHG | RESPIRATION RATE: 16 BRPM

## 2019-12-02 DIAGNOSIS — L29.9 PRURITIC DISORDER: Primary | ICD-10-CM

## 2019-12-02 PROCEDURE — 99282 EMERGENCY DEPT VISIT SF MDM: CPT

## 2019-12-02 PROCEDURE — 6370000000 HC RX 637 (ALT 250 FOR IP): Performed by: EMERGENCY MEDICINE

## 2019-12-02 RX ORDER — FAMOTIDINE 20 MG/1
20 TABLET, FILM COATED ORAL 2 TIMES DAILY
Qty: 14 TABLET | Refills: 0 | Status: SHIPPED | OUTPATIENT
Start: 2019-12-02 | End: 2020-11-27

## 2019-12-02 RX ORDER — FAMOTIDINE 20 MG/1
20 TABLET, FILM COATED ORAL ONCE
Status: COMPLETED | OUTPATIENT
Start: 2019-12-02 | End: 2019-12-02

## 2019-12-02 RX ORDER — PREDNISONE 20 MG/1
60 TABLET ORAL ONCE
Status: COMPLETED | OUTPATIENT
Start: 2019-12-02 | End: 2019-12-02

## 2019-12-02 RX ORDER — PREDNISONE 10 MG/1
TABLET ORAL
Qty: 20 TABLET | Refills: 0 | Status: SHIPPED | OUTPATIENT
Start: 2019-12-02 | End: 2019-12-12

## 2019-12-02 RX ORDER — OXYCODONE AND ACETAMINOPHEN 10; 325 MG/1; MG/1
1 TABLET ORAL EVERY 4 HOURS PRN
COMMUNITY

## 2019-12-02 RX ADMIN — PREDNISONE 60 MG: 20 TABLET ORAL at 14:17

## 2019-12-02 RX ADMIN — FAMOTIDINE 20 MG: 20 TABLET, FILM COATED ORAL at 14:17

## 2019-12-10 DIAGNOSIS — M48.061 LUMBAR STENOSIS WITHOUT NEUROGENIC CLAUDICATION: Primary | ICD-10-CM

## 2019-12-19 ENCOUNTER — HOSPITAL ENCOUNTER (OUTPATIENT)
Dept: GENERAL RADIOLOGY | Age: 69
Discharge: HOME | End: 2019-12-21
Payer: COMMERCIAL

## 2019-12-19 ENCOUNTER — OFFICE VISIT (OUTPATIENT)
Dept: NEUROSURGERY | Age: 69
End: 2019-12-19

## 2019-12-19 ENCOUNTER — HOSPITAL ENCOUNTER (OUTPATIENT)
Age: 69
Discharge: HOME | End: 2019-12-19
Payer: COMMERCIAL

## 2019-12-19 VITALS
HEART RATE: 100 BPM | DIASTOLIC BLOOD PRESSURE: 51 MMHG | SYSTOLIC BLOOD PRESSURE: 100 MMHG | HEIGHT: 60 IN | BODY MASS INDEX: 27.88 KG/M2 | WEIGHT: 142 LBS

## 2019-12-19 DIAGNOSIS — M51.36 LUMBAR DEGENERATIVE DISC DISEASE: Primary | ICD-10-CM

## 2019-12-19 DIAGNOSIS — M48.061 LUMBAR STENOSIS WITHOUT NEUROGENIC CLAUDICATION: ICD-10-CM

## 2019-12-19 PROCEDURE — 99024 POSTOP FOLLOW-UP VISIT: CPT | Performed by: PHYSICIAN ASSISTANT

## 2019-12-19 PROCEDURE — 72100 X-RAY EXAM L-S SPINE 2/3 VWS: CPT

## 2020-02-13 ENCOUNTER — HOSPITAL ENCOUNTER (OUTPATIENT)
Age: 70
Discharge: HOME | End: 2020-02-15
Payer: MEDICARE

## 2020-02-13 ENCOUNTER — OFFICE VISIT (OUTPATIENT)
Dept: NEUROSURGERY | Age: 70
End: 2020-02-13

## 2020-02-13 ENCOUNTER — HOSPITAL ENCOUNTER (OUTPATIENT)
Dept: GENERAL RADIOLOGY | Age: 70
Discharge: HOME | End: 2020-02-15
Payer: MEDICARE

## 2020-02-13 VITALS
HEART RATE: 97 BPM | DIASTOLIC BLOOD PRESSURE: 57 MMHG | HEIGHT: 61 IN | SYSTOLIC BLOOD PRESSURE: 104 MMHG | BODY MASS INDEX: 27.38 KG/M2 | WEIGHT: 145 LBS

## 2020-02-13 PROCEDURE — 72100 X-RAY EXAM L-S SPINE 2/3 VWS: CPT

## 2020-02-13 PROCEDURE — 99024 POSTOP FOLLOW-UP VISIT: CPT | Performed by: PHYSICIAN ASSISTANT

## 2020-11-27 ENCOUNTER — HOSPITAL ENCOUNTER (INPATIENT)
Age: 70
LOS: 4 days | Discharge: HOME | DRG: 871 | End: 2020-12-01
Attending: EMERGENCY MEDICINE | Admitting: FAMILY MEDICINE
Payer: MEDICARE

## 2020-11-27 ENCOUNTER — APPOINTMENT (OUTPATIENT)
Dept: GENERAL RADIOLOGY | Age: 70
DRG: 871 | End: 2020-11-27
Payer: MEDICARE

## 2020-11-27 ENCOUNTER — APPOINTMENT (OUTPATIENT)
Dept: CT IMAGING | Age: 70
DRG: 871 | End: 2020-11-27
Payer: MEDICARE

## 2020-11-27 PROBLEM — J18.9 PNEUMONIA: Status: ACTIVE | Noted: 2020-11-27

## 2020-11-27 LAB
ADENOVIRUS BY PCR: NOT DETECTED
ADENOVIRUS BY PCR: NOT DETECTED
ALBUMIN SERPL-MCNC: 3.8 G/DL (ref 3.5–5.2)
ALP BLD-CCNC: 88 U/L (ref 35–104)
ALT SERPL-CCNC: 18 U/L (ref 0–32)
ANION GAP SERPL CALCULATED.3IONS-SCNC: 13 MMOL/L (ref 7–16)
ANISOCYTOSIS: ABNORMAL
AST SERPL-CCNC: 28 U/L (ref 0–31)
BASOPHILS ABSOLUTE: 0.01 E9/L (ref 0–0.2)
BASOPHILS RELATIVE PERCENT: 0.3 % (ref 0–2)
BILIRUB SERPL-MCNC: 0.3 MG/DL (ref 0–1.2)
BORDETELLA PARAPERTUSSIS BY PCR: NOT DETECTED
BORDETELLA PARAPERTUSSIS BY PCR: NOT DETECTED
BORDETELLA PERTUSSIS BY PCR: NOT DETECTED
BORDETELLA PERTUSSIS BY PCR: NOT DETECTED
BUN BLDV-MCNC: 30 MG/DL (ref 8–23)
CALCIUM SERPL-MCNC: 9.2 MG/DL (ref 8.6–10.2)
CHLAMYDOPHILIA PNEUMONIAE BY PCR: NOT DETECTED
CHLAMYDOPHILIA PNEUMONIAE BY PCR: NOT DETECTED
CHLORIDE BLD-SCNC: 107 MMOL/L (ref 98–107)
CO2: 21 MMOL/L (ref 22–29)
CORONAVIRUS 229E BY PCR: NOT DETECTED
CORONAVIRUS 229E BY PCR: NOT DETECTED
CORONAVIRUS HKU1 BY PCR: NOT DETECTED
CORONAVIRUS HKU1 BY PCR: NOT DETECTED
CORONAVIRUS NL63 BY PCR: NOT DETECTED
CORONAVIRUS NL63 BY PCR: NOT DETECTED
CORONAVIRUS OC43 BY PCR: NOT DETECTED
CORONAVIRUS OC43 BY PCR: NOT DETECTED
CREAT SERPL-MCNC: 1.3 MG/DL (ref 0.5–1)
D DIMER: 914 NG/ML DDU
EKG ATRIAL RATE: 125 BPM
EKG P AXIS: 47 DEGREES
EKG P-R INTERVAL: 142 MS
EKG Q-T INTERVAL: 288 MS
EKG QRS DURATION: 72 MS
EKG QTC CALCULATION (BAZETT): 415 MS
EKG R AXIS: 30 DEGREES
EKG T AXIS: -53 DEGREES
EKG VENTRICULAR RATE: 125 BPM
EOSINOPHILS ABSOLUTE: 0.01 E9/L (ref 0.05–0.5)
EOSINOPHILS RELATIVE PERCENT: 0.3 % (ref 0–6)
GFR AFRICAN AMERICAN: 49
GFR NON-AFRICAN AMERICAN: 41 ML/MIN/1.73
GLUCOSE BLD-MCNC: 116 MG/DL (ref 74–99)
HCT VFR BLD CALC: 27.3 % (ref 34–48)
HEMOGLOBIN: 7.9 G/DL (ref 11.5–15.5)
HUMAN METAPNEUMOVIRUS BY PCR: NOT DETECTED
HUMAN METAPNEUMOVIRUS BY PCR: NOT DETECTED
HUMAN RHINOVIRUS/ENTEROVIRUS BY PCR: NOT DETECTED
HUMAN RHINOVIRUS/ENTEROVIRUS BY PCR: NOT DETECTED
HYPOCHROMIA: ABNORMAL
IMMATURE GRANULOCYTES #: 0 E9/L
IMMATURE GRANULOCYTES %: 0 % (ref 0–5)
INFLUENZA A BY PCR: NOT DETECTED
INFLUENZA A BY PCR: NOT DETECTED
INFLUENZA B BY PCR: NOT DETECTED
INFLUENZA B BY PCR: NOT DETECTED
LACTIC ACID: 2.3 MMOL/L (ref 0.5–2.2)
LACTIC ACID: 3.6 MMOL/L (ref 0.5–2.2)
LYMPHOCYTES ABSOLUTE: 0.29 E9/L (ref 1.5–4)
LYMPHOCYTES RELATIVE PERCENT: 9.5 % (ref 20–42)
MCH RBC QN AUTO: 21 PG (ref 26–35)
MCHC RBC AUTO-ENTMCNC: 28.9 % (ref 32–34.5)
MCV RBC AUTO: 72.4 FL (ref 80–99.9)
MONOCYTES ABSOLUTE: 0.16 E9/L (ref 0.1–0.95)
MONOCYTES RELATIVE PERCENT: 5.3 % (ref 2–12)
MYCOPLASMA PNEUMONIAE BY PCR: NOT DETECTED
MYCOPLASMA PNEUMONIAE BY PCR: NOT DETECTED
NEUTROPHILS ABSOLUTE: 2.57 E9/L (ref 1.8–7.3)
NEUTROPHILS RELATIVE PERCENT: 84.6 % (ref 43–80)
OVALOCYTES: ABNORMAL
PARAINFLUENZA VIRUS 1 BY PCR: NOT DETECTED
PARAINFLUENZA VIRUS 1 BY PCR: NOT DETECTED
PARAINFLUENZA VIRUS 2 BY PCR: NOT DETECTED
PARAINFLUENZA VIRUS 2 BY PCR: NOT DETECTED
PARAINFLUENZA VIRUS 3 BY PCR: NOT DETECTED
PARAINFLUENZA VIRUS 3 BY PCR: NOT DETECTED
PARAINFLUENZA VIRUS 4 BY PCR: NOT DETECTED
PARAINFLUENZA VIRUS 4 BY PCR: NOT DETECTED
PDW BLD-RTO: 18.7 FL (ref 11.5–15)
PLATELET # BLD: 204 E9/L (ref 130–450)
PMV BLD AUTO: 10.3 FL (ref 7–12)
POIKILOCYTES: ABNORMAL
POTASSIUM SERPL-SCNC: 4 MMOL/L (ref 3.5–5)
PRO-BNP: 252 PG/ML (ref 0–125)
PROCALCITONIN: 23.7 NG/ML (ref 0–0.08)
RBC # BLD: 3.77 E12/L (ref 3.5–5.5)
RESPIRATORY SYNCYTIAL VIRUS BY PCR: NOT DETECTED
RESPIRATORY SYNCYTIAL VIRUS BY PCR: NOT DETECTED
SARS-COV-2, PCR: NOT DETECTED
SARS-COV-2, PCR: NOT DETECTED
SODIUM BLD-SCNC: 141 MMOL/L (ref 132–146)
TOTAL PROTEIN: 6.7 G/DL (ref 6.4–8.3)
TROPONIN: 0.07 NG/ML (ref 0–0.03)
WBC # BLD: 3 E9/L (ref 4.5–11.5)

## 2020-11-27 PROCEDURE — 85025 COMPLETE CBC W/AUTO DIFF WBC: CPT

## 2020-11-27 PROCEDURE — 6360000002 HC RX W HCPCS: Performed by: EMERGENCY MEDICINE

## 2020-11-27 PROCEDURE — 6370000000 HC RX 637 (ALT 250 FOR IP): Performed by: FAMILY MEDICINE

## 2020-11-27 PROCEDURE — 85378 FIBRIN DEGRADE SEMIQUANT: CPT

## 2020-11-27 PROCEDURE — 93010 ELECTROCARDIOGRAM REPORT: CPT | Performed by: INTERNAL MEDICINE

## 2020-11-27 PROCEDURE — 87040 BLOOD CULTURE FOR BACTERIA: CPT

## 2020-11-27 PROCEDURE — 96367 TX/PROPH/DG ADDL SEQ IV INF: CPT

## 2020-11-27 PROCEDURE — 96366 THER/PROPH/DIAG IV INF ADDON: CPT

## 2020-11-27 PROCEDURE — 93005 ELECTROCARDIOGRAM TRACING: CPT | Performed by: EMERGENCY MEDICINE

## 2020-11-27 PROCEDURE — 36415 COLL VENOUS BLD VENIPUNCTURE: CPT

## 2020-11-27 PROCEDURE — 96375 TX/PRO/DX INJ NEW DRUG ADDON: CPT

## 2020-11-27 PROCEDURE — 2580000003 HC RX 258: Performed by: EMERGENCY MEDICINE

## 2020-11-27 PROCEDURE — 94640 AIRWAY INHALATION TREATMENT: CPT

## 2020-11-27 PROCEDURE — 80053 COMPREHEN METABOLIC PANEL: CPT

## 2020-11-27 PROCEDURE — 6370000000 HC RX 637 (ALT 250 FOR IP): Performed by: EMERGENCY MEDICINE

## 2020-11-27 PROCEDURE — 96365 THER/PROPH/DIAG IV INF INIT: CPT

## 2020-11-27 PROCEDURE — 84484 ASSAY OF TROPONIN QUANT: CPT

## 2020-11-27 PROCEDURE — 99285 EMERGENCY DEPT VISIT HI MDM: CPT

## 2020-11-27 PROCEDURE — 83880 ASSAY OF NATRIURETIC PEPTIDE: CPT

## 2020-11-27 PROCEDURE — 71250 CT THORAX DX C-: CPT

## 2020-11-27 PROCEDURE — 0202U NFCT DS 22 TRGT SARS-COV-2: CPT

## 2020-11-27 PROCEDURE — 2060000000 HC ICU INTERMEDIATE R&B

## 2020-11-27 PROCEDURE — 71045 X-RAY EXAM CHEST 1 VIEW: CPT

## 2020-11-27 PROCEDURE — 2500000003 HC RX 250 WO HCPCS: Performed by: EMERGENCY MEDICINE

## 2020-11-27 PROCEDURE — 84145 PROCALCITONIN (PCT): CPT

## 2020-11-27 PROCEDURE — 83605 ASSAY OF LACTIC ACID: CPT

## 2020-11-27 RX ORDER — FAMOTIDINE 20 MG/1
20 TABLET, FILM COATED ORAL DAILY
Status: DISCONTINUED | OUTPATIENT
Start: 2020-11-27 | End: 2020-12-01 | Stop reason: HOSPADM

## 2020-11-27 RX ORDER — 0.9 % SODIUM CHLORIDE 0.9 %
1000 INTRAVENOUS SOLUTION INTRAVENOUS ONCE
Status: COMPLETED | OUTPATIENT
Start: 2020-11-27 | End: 2020-11-27

## 2020-11-27 RX ORDER — LEVOFLOXACIN 5 MG/ML
500 INJECTION, SOLUTION INTRAVENOUS EVERY 24 HOURS
Status: DISCONTINUED | OUTPATIENT
Start: 2020-11-27 | End: 2020-11-28

## 2020-11-27 RX ORDER — IPRATROPIUM BROMIDE AND ALBUTEROL SULFATE 2.5; .5 MG/3ML; MG/3ML
1 SOLUTION RESPIRATORY (INHALATION)
Status: DISCONTINUED | OUTPATIENT
Start: 2020-11-27 | End: 2020-12-01 | Stop reason: HOSPADM

## 2020-11-27 RX ORDER — OXYCODONE AND ACETAMINOPHEN 10; 325 MG/1; MG/1
1 TABLET ORAL EVERY 4 HOURS PRN
Status: DISCONTINUED | OUTPATIENT
Start: 2020-11-27 | End: 2020-12-01 | Stop reason: HOSPADM

## 2020-11-27 RX ORDER — AMITRIPTYLINE HYDROCHLORIDE 25 MG/1
25 TABLET, FILM COATED ORAL NIGHTLY
Status: DISCONTINUED | OUTPATIENT
Start: 2020-11-27 | End: 2020-12-01 | Stop reason: HOSPADM

## 2020-11-27 RX ORDER — OXYCODONE HYDROCHLORIDE AND ACETAMINOPHEN 5; 325 MG/1; MG/1
2 TABLET ORAL ONCE
Status: COMPLETED | OUTPATIENT
Start: 2020-11-27 | End: 2020-11-27

## 2020-11-27 RX ORDER — DULOXETIN HYDROCHLORIDE 30 MG/1
30 CAPSULE, DELAYED RELEASE ORAL DAILY
Status: DISCONTINUED | OUTPATIENT
Start: 2020-11-27 | End: 2020-12-01 | Stop reason: HOSPADM

## 2020-11-27 RX ORDER — METHYLPREDNISOLONE SODIUM SUCCINATE 125 MG/2ML
125 INJECTION, POWDER, LYOPHILIZED, FOR SOLUTION INTRAMUSCULAR; INTRAVENOUS ONCE
Status: COMPLETED | OUTPATIENT
Start: 2020-11-27 | End: 2020-11-27

## 2020-11-27 RX ADMIN — SODIUM CHLORIDE 1000 ML: 9 INJECTION, SOLUTION INTRAVENOUS at 10:02

## 2020-11-27 RX ADMIN — PREGABALIN 200 MG: 150 CAPSULE ORAL at 20:14

## 2020-11-27 RX ADMIN — DULOXETINE HYDROCHLORIDE 30 MG: 30 CAPSULE, DELAYED RELEASE ORAL at 17:50

## 2020-11-27 RX ADMIN — AMITRIPTYLINE HYDROCHLORIDE 25 MG: 25 TABLET, FILM COATED ORAL at 20:14

## 2020-11-27 RX ADMIN — LEVOFLOXACIN 500 MG: 5 INJECTION, SOLUTION INTRAVENOUS at 10:05

## 2020-11-27 RX ADMIN — ENOXAPARIN SODIUM 60 MG: 100 INJECTION SUBCUTANEOUS at 10:20

## 2020-11-27 RX ADMIN — IPRATROPIUM BROMIDE AND ALBUTEROL SULFATE 1 AMPULE: .5; 2.5 SOLUTION RESPIRATORY (INHALATION) at 10:10

## 2020-11-27 RX ADMIN — METHYLPREDNISOLONE SODIUM SUCCINATE 125 MG: 125 INJECTION, POWDER, FOR SOLUTION INTRAMUSCULAR; INTRAVENOUS at 10:03

## 2020-11-27 RX ADMIN — OXYCODONE AND ACETAMINOPHEN 2 TABLET: 5; 325 TABLET ORAL at 12:14

## 2020-11-27 RX ADMIN — METRONIDAZOLE 500 MG: 500 INJECTION, SOLUTION INTRAVENOUS at 13:48

## 2020-11-27 RX ADMIN — IPRATROPIUM BROMIDE AND ALBUTEROL SULFATE 1 AMPULE: .5; 3 SOLUTION RESPIRATORY (INHALATION) at 16:32

## 2020-11-27 RX ADMIN — OXYCODONE HYDROCHLORIDE AND ACETAMINOPHEN 1 TABLET: 10; 325 TABLET ORAL at 20:19

## 2020-11-27 ASSESSMENT — PAIN DESCRIPTION - DESCRIPTORS: DESCRIPTORS: ACHING

## 2020-11-27 ASSESSMENT — PAIN SCALES - GENERAL
PAINLEVEL_OUTOF10: 5
PAINLEVEL_OUTOF10: 7
PAINLEVEL_OUTOF10: 6
PAINLEVEL_OUTOF10: 4

## 2020-11-27 ASSESSMENT — PAIN DESCRIPTION - ORIENTATION: ORIENTATION: LEFT;LOWER

## 2020-11-27 ASSESSMENT — PAIN DESCRIPTION - LOCATION: LOCATION: BACK;LEG

## 2020-11-27 ASSESSMENT — PAIN DESCRIPTION - PAIN TYPE: TYPE: CHRONIC PAIN

## 2020-11-27 NOTE — ED PROVIDER NOTES
HPI:  11/27/20, Time: 8:34 AM ENDY Jalloh is a 72 y.o. female presenting to the ED for shortness of breath, beginning this morning. The complaint has been persistent, moderate in severity, and worsened by light exertion and talking. It is associated with chills but not fever. She is a smoker. She denies exposure to Covid. Patient denies fever/chills, sore throat, cough, congestion, chest pain, edema, headache, visual disturbances, focal paresthesias, focal weakness, abdominal pain, nausea, vomiting, diarrhea, constipation, dysuria, hematuria, trauma, neck or back pain, rash or other complaints. ROS:   A complete review of systems was performed and all pertinent positives and negatives are stated within HPI, all other systems reviewed and are negative.      --------------------------------------------- PAST HISTORY ---------------------------------------------  Past Medical History:  has a past medical history of Chronic back pain and Hyperlipidemia. Past Surgical History:  has a past surgical history that includes back surgery (2001); Elbow surgery (2002); Wrist surgery (2002); Hysterectomy, total abdominal; Colonoscopy; and Lumbar spine surgery (N/A, 11/20/2019). Social History:  reports that she quit smoking about 16 months ago. Her smoking use included cigarettes. She smoked 0.50 packs per day. She has never used smokeless tobacco. She reports that she does not drink alcohol or use drugs. Family History: Family history is unknown by patient. The patients home medications have been reviewed. Allergies: Biaxin [clarithromycin]; Darvon [fd&c red #40-fd&c yellow #10-propoxyphene]; Erythromycin base; Iv dye [iodides]; Keflex [cephalexin]; Meloxicam; Methadone; Niacin and related; Penicillins; Polysorbate; Statins;  Yellow dyes (non-tartrazine); and Sulfa antibiotics        ----------------------------------------PHYSICAL EXAM--------------------------------------  Constitutional:  Well developed, well nourished, no acute distress, non-toxic appearance   Eyes:  PERRL, conjunctiva normal, EOMI  HENT:  Atraumatic, external ears normal, nose normal, oropharynx moist. Neck- normal range of motion, no nuchal rigidity   Respiratory: Moderate respiratory distress retractions, diffusely diminished breath sounds with diffuse scattered expiratory wheezing without rhonchi, rales or crackles. Speaking in 2-3 word sentences. Cardiovascular:  Tachycardic rate, normal rhythm, no murmurs, no gallops, no rubs. Radial and DP pulses 2+ bilaterally. GI:  Soft, nondistended, normal bowel sounds, nontender, no rebound, no guarding   :  No costovertebral angle tenderness   Musculoskeletal:  No edema, no tenderness, no deformities. Back- no tenderness  Integument:  Well hydrated, no visible rash. Adequate perfusion. Lymphatic:  No cervical lymphadenopathy noted   Neurologic:  Alert & oriented x 3, CN 2-12 normal,  no focal deficits noted. Psychiatric:  Speech and behavior appropriate       -------------------------------------------------- RESULTS -------------------------------------------------  I have personally reviewed all laboratory and imaging results for this patient. Results are listed below.      LABS:  Results for orders placed or performed during the hospital encounter of 11/27/20   Respiratory Panel, Molecular, with COVID-19 (Restricted: peds pts or suitable admitted adults)   Result Value Ref Range    Adenovirus by PCR Not Detected Not Detected    Bordetella parapertussis by PCR Not Detected Not Detected    Bordetella pertussis by PCR Not Detected Not Detected    Chlamydophilia pneumoniae by PCR Not Detected Not Detected    Coronavirus 229E by PCR Not Detected Not Detected    Coronavirus HKU1 by PCR Not Detected Not Detected    Coronavirus NL63 by PCR Not Detected Not Detected    Coronavirus OC43 by PCR Not Detected Not Detected SARS-CoV-2, PCR Not Detected Not Detected    Human Metapneumovirus by PCR Not Detected Not Detected    Human Rhinovirus/Enterovirus by PCR Not Detected Not Detected    Influenza A by PCR Not Detected Not Detected    Influenza B by PCR Not Detected Not Detected    Mycoplasma pneumoniae by PCR Not Detected Not Detected    Parainfluenza Virus 1 by PCR Not Detected Not Detected    Parainfluenza Virus 2 by PCR Not Detected Not Detected    Parainfluenza Virus 3 by PCR Not Detected Not Detected    Parainfluenza Virus 4 by PCR Not Detected Not Detected    Respiratory Syncytial Virus by PCR Not Detected Not Detected   D-Dimer, Quantitative   Result Value Ref Range    D-Dimer, Quant 914 ng/mL DDU   Troponin   Result Value Ref Range    Troponin 0.07 (H) 0.00 - 0.03 ng/mL   Brain Natriuretic Peptide   Result Value Ref Range    Pro- (H) 0 - 125 pg/mL   CBC auto differential   Result Value Ref Range    WBC 3.0 (L) 4.5 - 11.5 E9/L    RBC 3.77 3.50 - 5.50 E12/L    Hemoglobin 7.9 (L) 11.5 - 15.5 g/dL    Hematocrit 27.3 (L) 34.0 - 48.0 %    MCV 72.4 (L) 80.0 - 99.9 fL    MCH 21.0 (L) 26.0 - 35.0 pg    MCHC 28.9 (L) 32.0 - 34.5 %    RDW 18.7 (H) 11.5 - 15.0 fL    Platelets 706 875 - 191 E9/L    MPV 10.3 7.0 - 12.0 fL   Comprehensive Metabolic Panel   Result Value Ref Range    Sodium 141 132 - 146 mmol/L    Potassium 4.0 3.5 - 5.0 mmol/L    Chloride 107 98 - 107 mmol/L    CO2 21 (L) 22 - 29 mmol/L    Anion Gap 13 7 - 16 mmol/L    Glucose 116 (H) 74 - 99 mg/dL    BUN 30 (H) 8 - 23 mg/dL    CREATININE 1.3 (H) 0.5 - 1.0 mg/dL    GFR Non-African American 41 >=60 mL/min/1.73    GFR African American 49     Calcium 9.2 8.6 - 10.2 mg/dL    Total Protein 6.7 6.4 - 8.3 g/dL    Alb 3.8 3.5 - 5.2 g/dL    Total Bilirubin 0.3 0.0 - 1.2 mg/dL    Alkaline Phosphatase 88 35 - 104 U/L    ALT 18 0 - 32 U/L    AST 28 0 - 31 U/L   Lactic Acid, Plasma   Result Value Ref Range    Lactic Acid 3.6 (H) 0.5 - 2.2 mmol/L   EKG 12 Lead   Result Value Ref Range    Ventricular Rate 125 BPM    Atrial Rate 125 BPM    P-R Interval 142 ms    QRS Duration 72 ms    Q-T Interval 288 ms    QTc Calculation (Bazett) 415 ms    P Axis 47 degrees    R Axis 30 degrees    T Axis -53 degrees       RADIOLOGY:  Interpreted by Radiologist.  CT CHEST WO CONTRAST   Final Result   Patchy infiltrates in the right lung represents infectious process. Aspiration may also be considered in the proper clinical setting. XR CHEST PORTABLE   Final Result   Diffuse dense right lung airspace disease favored to represent diffuse   pneumonia. The left lung is clear. EKG Interpretation  Time: 0821  Rhythm: sinus tachycardia  Rate: 125  Axis: normal  Conduction: normal  ST Segments: nonspecific changes  T Waves: non specific changes  Clinical Impression: non-specific EKG and sinus tachycardia  Comparison to prior EKG: no previous EKG      ------------------------- NURSING NOTES AND VITALS REVIEWED ---------------------------  The nursing notes within the ED encounter and vital signs as below have been reviewed by myself. /60   Pulse 110   Temp 97.6 °F (36.4 °C)   Resp 16   Ht 5' 1\" (1.549 m)   Wt 140 lb (63.5 kg)   SpO2 92%   BMI 26.45 kg/m²   Oxygen Saturation Interpretation: Abnormal and Improved after treatment      The patients available past medical records and past encounters were reviewed.         ------------------------------ ED COURSE/MEDICAL DECISION MAKING----------------------  Medications   ipratropium-albuterol (DUONEB) nebulizer solution 1 ampule (1 ampule Inhalation Given 11/27/20 1010)   levoFLOXacin (LEVAQUIN) 500 MG/100ML infusion 500 mg (0 mg Intravenous Stopped 11/27/20 1230)   metronidazole (FLAGYL) 500 mg in NaCl 100 mL IVPB premix (has no administration in time range)   methylPREDNISolone sodium (SOLU-MEDROL) injection 125 mg (125 mg Intravenous Given 11/27/20 1003)   0.9 % sodium chloride bolus (0 mLs Intravenous Stopped 11/27/20 1307) enoxaparin (LOVENOX) injection 60 mg (60 mg Subcutaneous Given 11/27/20 1020)   oxyCODONE-acetaminophen (PERCOCET) 5-325 MG per tablet 2 tablet (2 tablets Oral Given 11/27/20 1214)           Procedures:   none      Medical Decision Making:    SEPSIS EVALUATION TOOL Time of diagnosis: 11/27/20 08:56AM    SIRS CRITERIA: (2 required)  Temperature <36 or >38  No  Heart rate >90    Yes  RR >20 or PCO2 <32   No  WBC >12 or <4 or >10% bands No    SEPSIS CRITERIA: (Both required)  Two or more of the above  Yes  Suspected source(s) of infection RLL PNA    ANTIBIOTIC SELECTION RATIONAL  antibiogram    ANTIBIOTIC SELECTION LIMITATIONS  Allergy    LACTIC ACID    Initial     3.6  Follow up - if initial abnormal (>2) Pending at time of admission    SEVERE SEPSIS CRITERIA: (All 3 of the following criteria needed)  1. SIRS Criteria met   Yes  2. Sepsis Criteria met   Yes  3. Organ dysfunction as evidenced by any one of the following Yes   A. Systolic ZE<47 or MAP< 65 or SBP decrease by >40 from baseline   B. Creatinine >2.0, or urine output <0.5 mL/kg/hr for 2 hours   C. Bilirubin > 2 mg/dL   D. Platelet count < 108,280   E. INR > 1.5 or aPTT > 60 sec   F. Lactate > 2 mmol/L    One dose therapeutic Lovenox given in event of PE (cannot give IV dye to rule out PE)        This patient's ED course included: multiple bedside re-evaluations, IV medications, cardiac monitoring, continuous pulse oximetry and a personal history and physicial eaxmination    This patient has remained hemodynamically stable, improved and been closely monitored during their ED course. Re-Evaluations:  Time: 1:17 PM EST   Re-evaluation.   Patients symptoms are improving  Repeat physical examination is improved      Consultations:   11:05 AM EST  Spoke with Dr Kathryn Gaona, discussed case, accepts admission    Critical Care: Please note that the withdrawal or failure to initiate urgent interventions for this patient would likely result in a life threatening deterioration or permanent disability. Accordingly this patient received 30 minutes of critical care time, excluding separately billable procedures. Sherice Montague DO, am the Primary Provider of Record    Counseling: The emergency provider has spoken with the patient and spouse/SO and discussed todays results, in addition to providing specific details for the plan of care and counseling regarding the diagnosis and prognosis. Questions are answered at this time and they are agreeable with the plan.    --------------------------- IMPRESSION AND DISPOSITION ---------------------------------    IMPRESSION  1. Acute respiratory failure with hypoxia ([facility] [address]    2. Pneumonia of right lower lobe due to infectious organism    3.  Septicemia ([facility] [address]        DISPOSITION  Disposition: Admit to telemetry  Patient condition is fair             Hoa Quevedo DO  11/27/20 1310

## 2020-11-28 LAB
ABO/RH: NORMAL
ANION GAP SERPL CALCULATED.3IONS-SCNC: 14 MMOL/L (ref 7–16)
ANTIBODY SCREEN: NORMAL
BLOOD BANK DISPENSE STATUS: NORMAL
BLOOD BANK PRODUCT CODE: NORMAL
BPU ID: NORMAL
BUN BLDV-MCNC: 24 MG/DL (ref 8–23)
CALCIUM SERPL-MCNC: 8.9 MG/DL (ref 8.6–10.2)
CHLORIDE BLD-SCNC: 106 MMOL/L (ref 98–107)
CO2: 20 MMOL/L (ref 22–29)
CREAT SERPL-MCNC: 1 MG/DL (ref 0.5–1)
DESCRIPTION BLOOD BANK: NORMAL
GFR AFRICAN AMERICAN: >60
GFR NON-AFRICAN AMERICAN: 55 ML/MIN/1.73
GLUCOSE BLD-MCNC: 127 MG/DL (ref 74–99)
HCT VFR BLD CALC: 24.1 % (ref 34–48)
HCT VFR BLD CALC: 25.9 % (ref 34–48)
HEMOGLOBIN: 6.9 G/DL (ref 11.5–15.5)
HEMOGLOBIN: 7.7 G/DL (ref 11.5–15.5)
MCH RBC QN AUTO: 20.6 PG (ref 26–35)
MCHC RBC AUTO-ENTMCNC: 28.6 % (ref 32–34.5)
MCV RBC AUTO: 71.9 FL (ref 80–99.9)
PDW BLD-RTO: 19.3 FL (ref 11.5–15)
PLATELET # BLD: 180 E9/L (ref 130–450)
PMV BLD AUTO: 10.7 FL (ref 7–12)
POTASSIUM SERPL-SCNC: 4.1 MMOL/L (ref 3.5–5)
RBC # BLD: 3.35 E12/L (ref 3.5–5.5)
SODIUM BLD-SCNC: 140 MMOL/L (ref 132–146)
WBC # BLD: 16.2 E9/L (ref 4.5–11.5)

## 2020-11-28 PROCEDURE — 36430 TRANSFUSION BLD/BLD COMPNT: CPT

## 2020-11-28 PROCEDURE — 6360000002 HC RX W HCPCS: Performed by: FAMILY MEDICINE

## 2020-11-28 PROCEDURE — 80048 BASIC METABOLIC PNL TOTAL CA: CPT

## 2020-11-28 PROCEDURE — 2580000003 HC RX 258: Performed by: FAMILY MEDICINE

## 2020-11-28 PROCEDURE — 86901 BLOOD TYPING SEROLOGIC RH(D): CPT

## 2020-11-28 PROCEDURE — 6370000000 HC RX 637 (ALT 250 FOR IP): Performed by: FAMILY MEDICINE

## 2020-11-28 PROCEDURE — 2060000000 HC ICU INTERMEDIATE R&B

## 2020-11-28 PROCEDURE — 6360000002 HC RX W HCPCS: Performed by: INTERNAL MEDICINE

## 2020-11-28 PROCEDURE — 86923 COMPATIBILITY TEST ELECTRIC: CPT

## 2020-11-28 PROCEDURE — 85014 HEMATOCRIT: CPT

## 2020-11-28 PROCEDURE — 99223 1ST HOSP IP/OBS HIGH 75: CPT | Performed by: INTERNAL MEDICINE

## 2020-11-28 PROCEDURE — 85018 HEMOGLOBIN: CPT

## 2020-11-28 PROCEDURE — 2580000003 HC RX 258: Performed by: INTERNAL MEDICINE

## 2020-11-28 PROCEDURE — 86850 RBC ANTIBODY SCREEN: CPT

## 2020-11-28 PROCEDURE — 36415 COLL VENOUS BLD VENIPUNCTURE: CPT

## 2020-11-28 PROCEDURE — P9016 RBC LEUKOCYTES REDUCED: HCPCS

## 2020-11-28 PROCEDURE — 94640 AIRWAY INHALATION TREATMENT: CPT

## 2020-11-28 PROCEDURE — 85027 COMPLETE CBC AUTOMATED: CPT

## 2020-11-28 PROCEDURE — 87449 NOS EACH ORGANISM AG IA: CPT

## 2020-11-28 PROCEDURE — 86900 BLOOD TYPING SEROLOGIC ABO: CPT

## 2020-11-28 RX ORDER — LEVOFLOXACIN 5 MG/ML
750 INJECTION, SOLUTION INTRAVENOUS EVERY 24 HOURS
Status: DISCONTINUED | OUTPATIENT
Start: 2020-11-29 | End: 2020-12-01 | Stop reason: HOSPADM

## 2020-11-28 RX ORDER — SODIUM CHLORIDE, SODIUM LACTATE, POTASSIUM CHLORIDE, CALCIUM CHLORIDE 600; 310; 30; 20 MG/100ML; MG/100ML; MG/100ML; MG/100ML
INJECTION, SOLUTION INTRAVENOUS CONTINUOUS
Status: ACTIVE | OUTPATIENT
Start: 2020-11-28 | End: 2020-11-28

## 2020-11-28 RX ORDER — SODIUM CHLORIDE 0.9 % (FLUSH) 0.9 %
10 SYRINGE (ML) INJECTION 2 TIMES DAILY
Status: DISCONTINUED | OUTPATIENT
Start: 2020-11-28 | End: 2020-12-01 | Stop reason: HOSPADM

## 2020-11-28 RX ORDER — 0.9 % SODIUM CHLORIDE 0.9 %
20 INTRAVENOUS SOLUTION INTRAVENOUS ONCE
Status: COMPLETED | OUTPATIENT
Start: 2020-11-28 | End: 2020-11-28

## 2020-11-28 RX ORDER — SODIUM CHLORIDE 0.9 % (FLUSH) 0.9 %
10 SYRINGE (ML) INJECTION PRN
Status: DISCONTINUED | OUTPATIENT
Start: 2020-11-28 | End: 2020-12-01 | Stop reason: HOSPADM

## 2020-11-28 RX ORDER — METHYLPREDNISOLONE SODIUM SUCCINATE 40 MG/ML
20 INJECTION, POWDER, LYOPHILIZED, FOR SOLUTION INTRAMUSCULAR; INTRAVENOUS EVERY 6 HOURS
Status: COMPLETED | OUTPATIENT
Start: 2020-11-28 | End: 2020-11-29

## 2020-11-28 RX ADMIN — LEVOFLOXACIN 500 MG: 5 INJECTION, SOLUTION INTRAVENOUS at 08:55

## 2020-11-28 RX ADMIN — PREGABALIN 200 MG: 150 CAPSULE ORAL at 08:55

## 2020-11-28 RX ADMIN — IPRATROPIUM BROMIDE AND ALBUTEROL SULFATE 1 AMPULE: .5; 3 SOLUTION RESPIRATORY (INHALATION) at 08:52

## 2020-11-28 RX ADMIN — OXYCODONE HYDROCHLORIDE AND ACETAMINOPHEN 1 TABLET: 10; 325 TABLET ORAL at 06:28

## 2020-11-28 RX ADMIN — SODIUM CHLORIDE 20 ML: 9 INJECTION, SOLUTION INTRAVENOUS at 11:12

## 2020-11-28 RX ADMIN — SODIUM CHLORIDE, PRESERVATIVE FREE 10 ML: 5 INJECTION INTRAVENOUS at 11:11

## 2020-11-28 RX ADMIN — IPRATROPIUM BROMIDE AND ALBUTEROL SULFATE 1 AMPULE: .5; 3 SOLUTION RESPIRATORY (INHALATION) at 17:40

## 2020-11-28 RX ADMIN — METHYLPREDNISOLONE SODIUM SUCCINATE 20 MG: 40 INJECTION, POWDER, FOR SOLUTION INTRAMUSCULAR; INTRAVENOUS at 15:44

## 2020-11-28 RX ADMIN — DULOXETINE HYDROCHLORIDE 30 MG: 30 CAPSULE, DELAYED RELEASE ORAL at 08:55

## 2020-11-28 RX ADMIN — IPRATROPIUM BROMIDE AND ALBUTEROL SULFATE 1 AMPULE: .5; 3 SOLUTION RESPIRATORY (INHALATION) at 13:49

## 2020-11-28 RX ADMIN — SODIUM CHLORIDE, POTASSIUM CHLORIDE, SODIUM LACTATE AND CALCIUM CHLORIDE: 600; 310; 30; 20 INJECTION, SOLUTION INTRAVENOUS at 15:44

## 2020-11-28 ASSESSMENT — PAIN SCALES - GENERAL
PAINLEVEL_OUTOF10: 0
PAINLEVEL_OUTOF10: 5

## 2020-11-28 ASSESSMENT — ENCOUNTER SYMPTOMS
ABDOMINAL PAIN: 0
SHORTNESS OF BREATH: 1

## 2020-11-28 NOTE — PLAN OF CARE
Problem: Pain:  Goal: Pain level will decrease  Description: Pain level will decrease  Outcome: Met This Shift  Goal: Control of acute pain  Description: Control of acute pain  Outcome: Met This Shift  Goal: Control of chronic pain  Description: Control of chronic pain  Outcome: Met This Shift     Problem: Falls - Risk of:  Goal: Will remain free from falls  Description: Will remain free from falls  Outcome: Met This Shift  Goal: Absence of physical injury  Description: Absence of physical injury  Outcome: Met This Shift     Problem: Breathing Pattern - Ineffective:  Goal: Ability to achieve and maintain a regular respiratory rate will improve  Description: Ability to achieve and maintain a regular respiratory rate will improve  Outcome: Met This Shift

## 2020-11-28 NOTE — CONSULTS
[place]  Department of Internal Medicine  Division of Pulmonary, Critical Care and Sleep Medicine  Consult Note    Agueda Campos DO, Iveth Spence, Elise Chavez MD, [facility]    Patient: Hiral Sanchez  MRN: [id number]  : 1950    Encounter Time: 2:57 PM     Date of Admission: 2020  8:16 AM    Primary Care Physician: Meryl Isaac MD    Reason for Consultation: Pneumonia      HISTORY OF PRESENT ILLNESS : Hiral Sanchez 72 y.o. female was seen in consultation regarding the above chief compliant. Mr. Gunner Londono has a PMHx for L4-L5 posterior lumbar interbody fusion with Dr. Donovan Das on 19, Chronic back pain, Hyperlipidemia, smoker recent, Osteopenia on bone test.                 Hiral Sanchez is a 72 y.o. female presenting to the ED for shortness of breath, beginning this morning. The complaint has been persistent, moderate in severity, and worsened by light exertion and talking. It is associated with chills but not fever. She is a smoker. She denies exposure to Covid. Patient denies fever/chills, sore throat, cough, congestion, chest pain, edema, headache, visual disturbances, focal paresthesias, focal weakness, abdominal pain, nausea, vomiting, diarrhea, constipation, dysuria, hematuria, trauma, neck or back pain, rash or other complaints. COVID PCR negative. CT scan chest with multifocal lobar infiltrate. PAST MEDICAL HISTORY:  has a past medical history of Chronic back pain and Hyperlipidemia. SURGICAL HISTORY:  has a past surgical history that includes back surgery (); Elbow surgery (); Wrist surgery (); Hysterectomy, total abdominal; Colonoscopy; and Lumbar spine surgery (N/A, 2019). SOCIAL HISTORY:  reports that she quit smoking about 17 months ago. Her smoking use included cigarettes. She smoked 0.50 packs per day.  She has never used smokeless tobacco. She reports that she does not drink alcohol or use drugs. FAMILY  HISTORY: Family history is unknown by patient. MEDICATIONS:    Prior to Admission medications    Medication Sig Start Date End Date Taking? Authorizing Provider   oxyCODONE-acetaminophen (PERCOCET)  MG per tablet Take 1 tablet by mouth every 4 hours as needed for Pain. Yes Historical Provider, MD   famotidine (PEPCID) 20 MG tablet Take 1 tablet by mouth 2 times daily for 7 days 12/2/19 11/27/20 Yes Melani Diamond,    pregabalin (LYRICA) 200 MG capsule Take 1 capsule by mouth 2 times daily for 30 days. 11/23/19 11/27/20 Yes Florentin Barnes MD   DULoxetine (CYMBALTA) 60 MG extended release capsule take 1 capsule by mouth once daily 9/23/19  Yes Historical Provider, MD   amitriptyline (ELAVIL) 25 MG tablet Take 1 tablet by mouth nightly. 11/23/12  Yes Arnoldo Johnston MD       ALLERGIES: Biaxin [clarithromycin]; Darvon [fd&c red #40-fd&c yellow #10-propoxyphene]; Erythromycin base; Iv dye [iodides]; Keflex [cephalexin]; Meloxicam; Methadone; Niacin and related; Penicillins; Polysorbate; Statins; Yellow dyes (non-tartrazine); and Sulfa antibiotics       REVIEW OF SYSTEMS:  Otherwise negative if not reported or listed below  Constitutional:  No unanticipated weight loss. No change in sleep pattern or activity. No fevers, chills or rigors. Eyes:    No visual changes or diplopia. No scleral icterus. ENT:    No Headaches, hearing loss or vertigo. No mouth sores or sore throat. Cardiovascular:  + chest discomfort, palpitations. Respiratory:  + cough, No wheezing      No sputum production. No hemoptysis, pleuritic pain. Gastrointestinal:  No abdominal pain, appetite loss, blood in stools. No hematemesis  Genitourinary:  No dysuria, trouble voiding or hematuria. No nocturia. Musculoskeletal:   No weakness or joint complaints. Integumentary: No rashes or pruritis. Neurological:  No headache, numbness or tingling.      Psychiatric:   No effect on mood, memory, mentation, or  behavior. No anxiety or depression. Endocrine:   No excessive thirst, fluid intake, or urination. No tremor. Hematologic:  No abnormal bruising or bleeding. Lymphatic:  No swollen lymph nodes. Immunologic:  No hives or hx of anaphaxsis. OBJECTIVE:     PHYSICAL EXAM:   VITALS:     Intake/Output Summary (Last 24 hours) at 11/28/2020 1457  Last data filed at 11/28/2020 1435  Gross per 24 hour   Intake 388.33 ml   Output --   Net 388.33 ml        CONSTITUTIONAL:   A&O x 3, NAD  SKIN:     No rash, No suspicious lesions or skin discoloration  HEENT:     EOMI, MMM, No thrush  NECK:    No bruits, No JVP apprechiated  CV:      Sinus,  No murmur, No rubs, No gallops  PULMONARY:   Couse BS,  No Wheezing, No Rales, No Rhonchi      No noted egophony  ABDOMEN:     Soft, non-tender. BS normal. No R/R/G  EXT:    No deformities . No clubbing. No lower extremity edema, No venous stasis  PULSE:   Appears equal and palpable.   PSYCHIATRIC:  Seems appropriate, No acute psycosis  MS:    No fractures, No gross weakness  NEUROLOGIC:   The clinical assessment is non-focal     DATA: IMAGING & TESTING:     LABORATORY TESTS:    CBC:   Lab Results   Component Value Date    WBC 16.2 11/28/2020    RBC 3.35 11/28/2020    HGB 6.9 11/28/2020    HCT 24.1 11/28/2020    MCV 71.9 11/28/2020    MCH 20.6 11/28/2020    MCHC 28.6 11/28/2020    RDW 19.3 11/28/2020     11/28/2020    MPV 10.7 11/28/2020        PRO-BNP:   Lab Results   Component Value Date    PROBNP 252 (H) 11/27/2020      ABGs: No results found for: PH, PO2, PCO2  Hemoglobin A1C: No components found for: HGBA1C    IMAGING:  Imaging tests were completed and reviewed and discussed radiology and care team involved and reveals   Ct Chest Wo Contrast    Result Date: 11/27/2020  EXAMINATION: CT OF THE CHEST WITHOUT CONTRAST 11/27/2020 10:48 am TECHNIQUE: CT of the chest was performed without the administration of intravenous contrast. Multiplanar reformatted images are provided for review. Dose modulation, iterative reconstruction, and/or weight based adjustment of the mA/kV was utilized to reduce the radiation dose to as low as reasonably achievable. COMPARISON: None. HISTORY: ORDERING SYSTEM PROVIDED HISTORY: hypoxia TECHNOLOGIST PROVIDED HISTORY: Reason for exam:->hypoxia FINDINGS: Mediastinum: No abnormal lymphadenopathy. The pulmonary trunk is normal in size Lungs/pleura: Patchy infiltrates and ground-glass opacity seen in the right lung. Left lung is clear. No pleural effusions. No pneumothorax. Upper Abdomen: No acute process identified Soft Tissues/Bones: No aggressive osseous lesions. Patchy infiltrates in the right lung represents infectious process. Aspiration may also be considered in the proper clinical setting. Assessment:   1. Severe Hypoxmeic respiratory failure  2. Multilobar pneumonia  3. Sepsis  4. Remote smoker        Plan:   1. Step and legionella antigens  2. Increase Abx coverage as mulitlobar nature is concerning  3. Bronchofilators  4. Steroids IV  5. Hydrate and follow labs  6.  Oxygen support      Andrez Key, MPH, [facility]  Professor of Medicine  Pulmonary, Critical Care and Sleep Medicine

## 2020-11-28 NOTE — H&P
HISTORY AND PHYSICAL             Date: 11/28/2020        Patient Name: Aura Amor     YOB: 1950      Age:  72 y.o. Chief Complaint     Chief Complaint   Patient presents with    Shortness of Breath     started around 0615. History Obtained From   patient    History of Present Illness   Patient says she became more short of breath over the past few days. She has some mild chest pain. Past Medical History     Past Medical History:   Diagnosis Date    Chronic back pain     Hyperlipidemia         Past Surgical History     Past Surgical History:   Procedure Laterality Date    BACK SURGERY  2001    COLONOSCOPY      ELBOW SURGERY  2002    HYSTERECTOMY, TOTAL ABDOMINAL      LUMBAR SPINE SURGERY N/A 11/20/2019    REMOVAL OF OLD HARDWARE L5-S1 FUSION AND L4-L5 POSTERIOR LUMBAR INTERBODY FUSION performed by Neptali Barcenas MD at [place] [id number]  2002        Medications Prior to Admission     Prior to Admission medications    Medication Sig Start Date End Date Taking? Authorizing Provider   oxyCODONE-acetaminophen (PERCOCET)  MG per tablet Take 1 tablet by mouth every 4 hours as needed for Pain. Yes Historical Provider, MD   famotidine (PEPCID) 20 MG tablet Take 1 tablet by mouth 2 times daily for 7 days 12/2/19 11/27/20 Yes Mariia Lucas DO   pregabalin (LYRICA) 200 MG capsule Take 1 capsule by mouth 2 times daily for 30 days. 11/23/19 11/27/20 Yes Neptali Barcenas MD   DULoxetine (CYMBALTA) 60 MG extended release capsule take 1 capsule by mouth once daily 9/23/19  Yes Historical Provider, MD   amitriptyline (ELAVIL) 25 MG tablet Take 1 tablet by mouth nightly. 11/23/12  Yes Yeny Reynoso MD        Allergies   Biaxin [clarithromycin]; Darvon [fd&c red #40-fd&c yellow #10-propoxyphene]; Erythromycin base; Iv dye [iodides]; Keflex [cephalexin]; Meloxicam; Methadone; Niacin and related; Penicillins; Polysorbate; Statins;  Yellow dyes (non-tartrazine); and Sulfa antibiotics    Social History     Social History     Tobacco History     Smoking Status  Former Smoker Quit date  6/30/2019 Smoking Frequency  0.5 packs/day Smoking Tobacco Type  Cigarettes    Smokeless Tobacco Use  Never Used          Alcohol History     Alcohol Use Status  No          Drug Use     Drug Use Status  No          Sexual Activity     Sexually Active  Not Currently                Family History     Family History   Family history unknown: Yes       Review of Systems   Review of Systems   Constitutional: Positive for activity change. HENT: Negative for congestion. Respiratory: Positive for shortness of breath. Cardiovascular: Positive for chest pain. Gastrointestinal: Negative for abdominal pain. Genitourinary: Negative for difficulty urinating. Musculoskeletal: Negative for arthralgias. Neurological: Negative for dizziness. Hematological: Negative for adenopathy. Psychiatric/Behavioral: Negative for agitation. Physical Exam   BP (!) 107/41   Pulse 85   Temp 96.9 °F (36.1 °C) (Temporal)   Resp 20   Ht 5' 1\" (1.549 m)   Wt 140 lb (63.5 kg)   SpO2 91%   BMI 26.45 kg/m²     Physical Exam  Vitals signs reviewed. HENT:      Head: Normocephalic. Mouth/Throat:      Mouth: Mucous membranes are moist.   Eyes:      Pupils: Pupils are equal, round, and reactive to light. Cardiovascular:      Rate and Rhythm: Normal rate and regular rhythm. Heart sounds: Normal heart sounds. Pulmonary:      Effort: Pulmonary effort is normal. No respiratory distress. Breath sounds: Normal breath sounds. No wheezing. Abdominal:      General: Abdomen is flat. Bowel sounds are normal. There is no distension. Tenderness: There is no abdominal tenderness. Skin:     General: Skin is warm and dry. Capillary Refill: Capillary refill takes less than 2 seconds. Neurological:      General: No focal deficit present.       Mental Status: She is alert and oriented to person, Chloride 107 98 - 107 mmol/L    CO2 21 (L) 22 - 29 mmol/L    Anion Gap 13 7 - 16 mmol/L    Glucose 116 (H) 74 - 99 mg/dL    BUN 30 (H) 8 - 23 mg/dL    CREATININE 1.3 (H) 0.5 - 1.0 mg/dL    GFR Non-African American 41 >=60 mL/min/1.73    GFR African American 49     Calcium 9.2 8.6 - 10.2 mg/dL    Total Protein 6.7 6.4 - 8.3 g/dL    Alb 3.8 3.5 - 5.2 g/dL    Total Bilirubin 0.3 0.0 - 1.2 mg/dL    Alkaline Phosphatase 88 35 - 104 U/L    ALT 18 0 - 32 U/L    AST 28 0 - 31 U/L   Respiratory Panel, Molecular, with COVID-19 (Restricted: peds pts or suitable admitted adults)    Collection Time: 11/27/20  9:18 AM   Result Value Ref Range    Adenovirus by PCR Not Detected Not Detected    Bordetella parapertussis by PCR Not Detected Not Detected    Bordetella pertussis by PCR Not Detected Not Detected    Chlamydophilia pneumoniae by PCR Not Detected Not Detected    Coronavirus 229E by PCR Not Detected Not Detected    Coronavirus HKU1 by PCR Not Detected Not Detected    Coronavirus NL63 by PCR Not Detected Not Detected    Coronavirus OC43 by PCR Not Detected Not Detected    SARS-CoV-2, PCR Not Detected Not Detected    Human Metapneumovirus by PCR Not Detected Not Detected    Human Rhinovirus/Enterovirus by PCR Not Detected Not Detected    Influenza A by PCR Not Detected Not Detected    Influenza B by PCR Not Detected Not Detected    Mycoplasma pneumoniae by PCR Not Detected Not Detected    Parainfluenza Virus 1 by PCR Not Detected Not Detected    Parainfluenza Virus 2 by PCR Not Detected Not Detected    Parainfluenza Virus 3 by PCR Not Detected Not Detected    Parainfluenza Virus 4 by PCR Not Detected Not Detected    Respiratory Syncytial Virus by PCR Not Detected Not Detected   Lactic Acid, Plasma    Collection Time: 11/27/20  9:25 AM   Result Value Ref Range    Lactic Acid 3.6 (H) 0.5 - 2.2 mmol/L   Lactic Acid, Plasma    Collection Time: 11/27/20  1:42 PM   Result Value Ref Range    Lactic Acid 2.3 (H) 0.5 - 2.2 mmol/L Respiratory Panel, Molecular, with COVID-19 (Restricted: peds pts or suitable admitted adults)    Collection Time: 11/27/20  4:20 PM    Specimen: Nasopharyngeal; Nasal   Result Value Ref Range    Adenovirus by PCR Not Detected Not Detected    Bordetella parapertussis by PCR Not Detected Not Detected    Bordetella pertussis by PCR Not Detected Not Detected    Chlamydophilia pneumoniae by PCR Not Detected Not Detected    Coronavirus 229E by PCR Not Detected Not Detected    Coronavirus HKU1 by PCR Not Detected Not Detected    Coronavirus NL63 by PCR Not Detected Not Detected    Coronavirus OC43 by PCR Not Detected Not Detected    SARS-CoV-2, PCR Not Detected Not Detected    Human Metapneumovirus by PCR Not Detected Not Detected    Human Rhinovirus/Enterovirus by PCR Not Detected Not Detected    Influenza A by PCR Not Detected Not Detected    Influenza B by PCR Not Detected Not Detected    Mycoplasma pneumoniae by PCR Not Detected Not Detected    Parainfluenza Virus 1 by PCR Not Detected Not Detected    Parainfluenza Virus 2 by PCR Not Detected Not Detected    Parainfluenza Virus 3 by PCR Not Detected Not Detected    Parainfluenza Virus 4 by PCR Not Detected Not Detected    Respiratory Syncytial Virus by PCR Not Detected Not Detected   Procalcitonin    Collection Time: 11/27/20  4:57 PM   Result Value Ref Range    Procalcitonin 23.70 (H) 0.00 - 0.08 ng/mL   CBC    Collection Time: 11/28/20  6:21 AM   Result Value Ref Range    WBC 16.2 (H) 4.5 - 11.5 E9/L    RBC 3.35 (L) 3.50 - 5.50 E12/L    Hemoglobin 6.9 (L) 11.5 - 15.5 g/dL    Hematocrit 24.1 (L) 34.0 - 48.0 %    MCV 71.9 (L) 80.0 - 99.9 fL    MCH 20.6 (L) 26.0 - 35.0 pg    MCHC 28.6 (L) 32.0 - 34.5 %    RDW 19.3 (H) 11.5 - 15.0 fL    Platelets 893 140 - 927 E9/L    MPV 10.7 7.0 - 12.0 fL        Imaging/Diagnostics Last 24 Hours   Ct Chest Wo Contrast    Result Date: 11/27/2020  EXAMINATION: CT OF THE CHEST WITHOUT CONTRAST 11/27/2020 10:48 am TECHNIQUE: CT of the chest was performed without the administration of intravenous contrast. Multiplanar reformatted images are provided for review. Dose modulation, iterative reconstruction, and/or weight based adjustment of the mA/kV was utilized to reduce the radiation dose to as low as reasonably achievable. COMPARISON: None. HISTORY: ORDERING SYSTEM PROVIDED HISTORY: hypoxia TECHNOLOGIST PROVIDED HISTORY: Reason for exam:->hypoxia FINDINGS: Mediastinum: No abnormal lymphadenopathy. The pulmonary trunk is normal in size Lungs/pleura: Patchy infiltrates and ground-glass opacity seen in the right lung. Left lung is clear. No pleural effusions. No pneumothorax. Upper Abdomen: No acute process identified Soft Tissues/Bones: No aggressive osseous lesions. Patchy infiltrates in the right lung represents infectious process. Aspiration may also be considered in the proper clinical setting. Xr Chest Portable    Result Date: 11/27/2020  EXAMINATION: ONE XRAY VIEW OF THE CHEST 11/27/2020 8:56 am COMPARISON: 11/18/2019 HISTORY: ORDERING SYSTEM PROVIDED HISTORY: dyspnea TECHNOLOGIST PROVIDED HISTORY: Reason for exam:->dyspnea FINDINGS: Multifocal dense airspace disease is seen throughout the right lung favored to represent pneumonia. The left lung is clear. The heart is not enlarged. No findings of failure. There is no left pleural effusion. Diffuse dense right lung airspace disease favored to represent diffuse pneumonia. The left lung is clear. Assessment      Hospital Problems           Last Modified POA    Pneumonia 11/27/2020 Yes      Hyperlipidemia    Plan   IV levaquin due to allergies. Procalcitonin elevated. Pulmonary consult. Monitor labs and cultures. Continue rest of meds.     Consultations Ordered:  IP CONSULT TO HOSPITALIST  IP CONSULT TO PULMONOLOGY    Electronically signed by Ric Neumann MD on 11/28/20 at 7:33 AM EST

## 2020-11-29 LAB
ANION GAP SERPL CALCULATED.3IONS-SCNC: 14 MMOL/L (ref 7–16)
BUN BLDV-MCNC: 23 MG/DL (ref 8–23)
CALCIUM SERPL-MCNC: 9.3 MG/DL (ref 8.6–10.2)
CHLORIDE BLD-SCNC: 106 MMOL/L (ref 98–107)
CO2: 21 MMOL/L (ref 22–29)
CREAT SERPL-MCNC: 1 MG/DL (ref 0.5–1)
GFR AFRICAN AMERICAN: >60
GFR NON-AFRICAN AMERICAN: 55 ML/MIN/1.73
GLUCOSE BLD-MCNC: 186 MG/DL (ref 74–99)
HCT VFR BLD CALC: 28.9 % (ref 34–48)
HEMOGLOBIN: 8.5 G/DL (ref 11.5–15.5)
L. PNEUMOPHILA SEROGP 1 UR AG: NORMAL
MCH RBC QN AUTO: 22 PG (ref 26–35)
MCHC RBC AUTO-ENTMCNC: 29.4 % (ref 32–34.5)
MCV RBC AUTO: 74.7 FL (ref 80–99.9)
PDW BLD-RTO: 21.2 FL (ref 11.5–15)
PLATELET # BLD: 178 E9/L (ref 130–450)
PMV BLD AUTO: 10.2 FL (ref 7–12)
POTASSIUM SERPL-SCNC: 5 MMOL/L (ref 3.5–5)
RBC # BLD: 3.87 E12/L (ref 3.5–5.5)
SODIUM BLD-SCNC: 141 MMOL/L (ref 132–146)
STREP PNEUMONIAE ANTIGEN, URINE: NORMAL
WBC # BLD: 14.7 E9/L (ref 4.5–11.5)

## 2020-11-29 PROCEDURE — 2060000000 HC ICU INTERMEDIATE R&B

## 2020-11-29 PROCEDURE — 85027 COMPLETE CBC AUTOMATED: CPT

## 2020-11-29 PROCEDURE — 6360000002 HC RX W HCPCS: Performed by: INTERNAL MEDICINE

## 2020-11-29 PROCEDURE — 80048 BASIC METABOLIC PNL TOTAL CA: CPT

## 2020-11-29 PROCEDURE — 6360000002 HC RX W HCPCS: Performed by: FAMILY MEDICINE

## 2020-11-29 PROCEDURE — 36415 COLL VENOUS BLD VENIPUNCTURE: CPT

## 2020-11-29 PROCEDURE — 94640 AIRWAY INHALATION TREATMENT: CPT

## 2020-11-29 PROCEDURE — 6370000000 HC RX 637 (ALT 250 FOR IP): Performed by: FAMILY MEDICINE

## 2020-11-29 PROCEDURE — 99233 SBSQ HOSP IP/OBS HIGH 50: CPT | Performed by: INTERNAL MEDICINE

## 2020-11-29 PROCEDURE — 2580000003 HC RX 258: Performed by: FAMILY MEDICINE

## 2020-11-29 PROCEDURE — 2700000000 HC OXYGEN THERAPY PER DAY

## 2020-11-29 RX ORDER — HYDRALAZINE HYDROCHLORIDE 20 MG/ML
10 INJECTION INTRAMUSCULAR; INTRAVENOUS EVERY 4 HOURS PRN
Status: DISCONTINUED | OUTPATIENT
Start: 2020-11-29 | End: 2020-12-01 | Stop reason: HOSPADM

## 2020-11-29 RX ADMIN — DULOXETINE HYDROCHLORIDE 30 MG: 30 CAPSULE, DELAYED RELEASE ORAL at 08:19

## 2020-11-29 RX ADMIN — AMITRIPTYLINE HYDROCHLORIDE 25 MG: 25 TABLET, FILM COATED ORAL at 00:11

## 2020-11-29 RX ADMIN — SODIUM CHLORIDE, PRESERVATIVE FREE 10 ML: 5 INJECTION INTRAVENOUS at 20:36

## 2020-11-29 RX ADMIN — PREGABALIN 200 MG: 150 CAPSULE ORAL at 00:11

## 2020-11-29 RX ADMIN — SODIUM CHLORIDE, PRESERVATIVE FREE 10 ML: 5 INJECTION INTRAVENOUS at 00:12

## 2020-11-29 RX ADMIN — IPRATROPIUM BROMIDE AND ALBUTEROL SULFATE 1 AMPULE: .5; 3 SOLUTION RESPIRATORY (INHALATION) at 09:19

## 2020-11-29 RX ADMIN — OXYCODONE HYDROCHLORIDE AND ACETAMINOPHEN 1 TABLET: 10; 325 TABLET ORAL at 00:11

## 2020-11-29 RX ADMIN — PREGABALIN 200 MG: 150 CAPSULE ORAL at 20:36

## 2020-11-29 RX ADMIN — OXYCODONE HYDROCHLORIDE AND ACETAMINOPHEN 1 TABLET: 10; 325 TABLET ORAL at 20:36

## 2020-11-29 RX ADMIN — METHYLPREDNISOLONE SODIUM SUCCINATE 20 MG: 40 INJECTION, POWDER, FOR SOLUTION INTRAMUSCULAR; INTRAVENOUS at 11:09

## 2020-11-29 RX ADMIN — SODIUM CHLORIDE, PRESERVATIVE FREE 10 ML: 5 INJECTION INTRAVENOUS at 08:19

## 2020-11-29 RX ADMIN — METHYLPREDNISOLONE SODIUM SUCCINATE 20 MG: 40 INJECTION, POWDER, FOR SOLUTION INTRAMUSCULAR; INTRAVENOUS at 23:36

## 2020-11-29 RX ADMIN — METHYLPREDNISOLONE SODIUM SUCCINATE 20 MG: 40 INJECTION, POWDER, FOR SOLUTION INTRAMUSCULAR; INTRAVENOUS at 16:35

## 2020-11-29 RX ADMIN — IPRATROPIUM BROMIDE AND ALBUTEROL SULFATE 1 AMPULE: .5; 3 SOLUTION RESPIRATORY (INHALATION) at 16:19

## 2020-11-29 RX ADMIN — HYDRALAZINE HYDROCHLORIDE 10 MG: 20 INJECTION, SOLUTION INTRAMUSCULAR; INTRAVENOUS at 16:35

## 2020-11-29 RX ADMIN — LEVOFLOXACIN 750 MG: 5 INJECTION, SOLUTION INTRAVENOUS at 08:19

## 2020-11-29 RX ADMIN — METHYLPREDNISOLONE SODIUM SUCCINATE 20 MG: 40 INJECTION, POWDER, FOR SOLUTION INTRAMUSCULAR; INTRAVENOUS at 04:52

## 2020-11-29 RX ADMIN — AMITRIPTYLINE HYDROCHLORIDE 25 MG: 25 TABLET, FILM COATED ORAL at 20:36

## 2020-11-29 RX ADMIN — METHYLPREDNISOLONE SODIUM SUCCINATE 20 MG: 40 INJECTION, POWDER, FOR SOLUTION INTRAMUSCULAR; INTRAVENOUS at 00:12

## 2020-11-29 RX ADMIN — PREGABALIN 200 MG: 150 CAPSULE ORAL at 08:19

## 2020-11-29 RX ADMIN — HYDRALAZINE HYDROCHLORIDE 10 MG: 20 INJECTION, SOLUTION INTRAMUSCULAR; INTRAVENOUS at 20:36

## 2020-11-29 ASSESSMENT — ENCOUNTER SYMPTOMS
ABDOMINAL PAIN: 0
SHORTNESS OF BREATH: 1

## 2020-11-29 ASSESSMENT — PAIN SCALES - GENERAL
PAINLEVEL_OUTOF10: 5
PAINLEVEL_OUTOF10: 5
PAINLEVEL_OUTOF10: 6
PAINLEVEL_OUTOF10: 0
PAINLEVEL_OUTOF10: 0

## 2020-11-29 NOTE — PROGRESS NOTES
Progress Note  Date:2020       Room:8513/8513-A  Patient Александр Hopson     YOB: 1950     Age:70 y.o. Patient says breathing is improved today. Subjective    Subjective:  Symptoms:  Improved. She reports shortness of breath. No chest pain. Diet:  Adequate intake. Activity level: Impaired due to weakness. Pain:  She reports no pain. Review of Systems   Constitutional: Positive for activity change. Negative for fever. HENT: Negative for congestion. Respiratory: Positive for shortness of breath. Cardiovascular: Negative for chest pain. Gastrointestinal: Negative for abdominal pain. Genitourinary: Negative for difficulty urinating. Musculoskeletal: Negative for arthralgias. Neurological: Negative for dizziness. Hematological: Negative for adenopathy. Psychiatric/Behavioral: Negative for agitation. Objective         Vitals Last 24 Hours:  TEMPERATURE:  Temp  Av.7 °F (36.5 °C)  Min: 97.1 °F (36.2 °C)  Max: 98.3 °F (36.8 °C)  RESPIRATIONS RANGE: Resp  Av.4  Min: 16  Max: 20  PULSE OXIMETRY RANGE: SpO2  Av.8 %  Min: 94 %  Max: 99 %  PULSE RANGE: Pulse  Av.8  Min: 80  Max: 91  BLOOD PRESSURE RANGE: Systolic (91YZT), KRY:610 , Min:98 , NUN:194   ; Diastolic (88PER), XXL:24, Min:48, Max:60    I/O (24Hr): Intake/Output Summary (Last 24 hours) at 2020 0817  Last data filed at 2020 0453  Gross per 24 hour   Intake 2109.02 ml   Output 1400 ml   Net 709.02 ml     Objective:  General Appearance:  Comfortable. Vital signs: (most recent): Blood pressure (!) 170/60, pulse 91, temperature 97.1 °F (36.2 °C), temperature source Temporal, resp. rate 20, height 5' 1\" (1.549 m), weight 140 lb (63.5 kg), SpO2 95 %. No fever. Lungs:  She is not in respiratory distress. There are decreased breath sounds. Heart: Normal rate. Regular rhythm.   S1 normal and S2 normal.      Labs/Imaging/Diagnostics    Labs:  CBC:  Recent Labs 11/27/20  0838 11/28/20  0621 11/28/20  1534 11/29/20  0540   WBC 3.0* 16.2*  --  14.7*   RBC 3.77 3.35*  --  3.87   HGB 7.9* 6.9* 7.7* 8.5*   HCT 27.3* 24.1* 25.9* 28.9*   MCV 72.4* 71.9*  --  74.7*   RDW 18.7* 19.3*  --  21.2*    180  --  178     CHEMISTRIES:  Recent Labs     11/27/20  0838 11/28/20  0621 11/29/20  0540    140 141   K 4.0 4.1 5.0    106 106   CO2 21* 20* 21*   BUN 30* 24* 23   CREATININE 1.3* 1.0 1.0   GLUCOSE 116* 127* 186*     PT/INR:No results for input(s): PROTIME, INR in the last 72 hours. APTT:No results for input(s): APTT in the last 72 hours. LIVER PROFILE:  Recent Labs     11/27/20  0838   AST 28   ALT 18   BILITOT 0.3   ALKPHOS 88       Imaging Last 24 Hours:  Ct Chest Wo Contrast    Result Date: 11/27/2020  EXAMINATION: CT OF THE CHEST WITHOUT CONTRAST 11/27/2020 10:48 am TECHNIQUE: CT of the chest was performed without the administration of intravenous contrast. Multiplanar reformatted images are provided for review. Dose modulation, iterative reconstruction, and/or weight based adjustment of the mA/kV was utilized to reduce the radiation dose to as low as reasonably achievable. COMPARISON: None. HISTORY: ORDERING SYSTEM PROVIDED HISTORY: hypoxia TECHNOLOGIST PROVIDED HISTORY: Reason for exam:->hypoxia FINDINGS: Mediastinum: No abnormal lymphadenopathy. The pulmonary trunk is normal in size Lungs/pleura: Patchy infiltrates and ground-glass opacity seen in the right lung. Left lung is clear. No pleural effusions. No pneumothorax. Upper Abdomen: No acute process identified Soft Tissues/Bones: No aggressive osseous lesions. Patchy infiltrates in the right lung represents infectious process. Aspiration may also be considered in the proper clinical setting.     Xr Chest Portable    Result Date: 11/27/2020  EXAMINATION: ONE XRAY VIEW OF THE CHEST 11/27/2020 8:56 am COMPARISON: 11/18/2019 HISTORY: ORDERING SYSTEM PROVIDED HISTORY: dyspnea TECHNOLOGIST PROVIDED HISTORY: Reason for exam:->dyspnea FINDINGS: Multifocal dense airspace disease is seen throughout the right lung favored to represent pneumonia. The left lung is clear. The heart is not enlarged. No findings of failure. There is no left pleural effusion. Diffuse dense right lung airspace disease favored to represent diffuse pneumonia. The left lung is clear. Assessment//Plan           Hospital Problems           Last Modified POA    Pneumonia 11/27/2020 Yes        Assessment:  (Pneumonia  Acute hypoxic respiratory failure  Hyperlipidemia). Plan:   (Appreciate Pulmonary management. Continue Abx, steroids, nebs. Monitor labs and cultures. ).        Electronically signed by Lisa Sharma MD on 11/29/20 at 8:17 AM EST

## 2020-11-29 NOTE — PROGRESS NOTES
discussed radiology and care team involved and reveals   Ct Chest Wo Contrast    Result Date: 11/27/2020  EXAMINATION: CT OF THE CHEST WITHOUT CONTRAST 11/27/2020 10:48 am TECHNIQUE: CT of the chest was performed without the administration of intravenous contrast. Multiplanar reformatted images are provided for review. Dose modulation, iterative reconstruction, and/or weight based adjustment of the mA/kV was utilized to reduce the radiation dose to as low as reasonably achievable. COMPARISON: None. HISTORY: ORDERING SYSTEM PROVIDED HISTORY: hypoxia TECHNOLOGIST PROVIDED HISTORY: Reason for exam:->hypoxia FINDINGS: Mediastinum: No abnormal lymphadenopathy. The pulmonary trunk is normal in size Lungs/pleura: Patchy infiltrates and ground-glass opacity seen in the right lung. Left lung is clear. No pleural effusions. No pneumothorax. Upper Abdomen: No acute process identified Soft Tissues/Bones: No aggressive osseous lesions. Patchy infiltrates in the right lung represents infectious process. Aspiration may also be considered in the proper clinical setting. Assessment:   1. Severe Hypoxmeic respiratory failure  2. Multilobar pneumonia  3. Sepsis  4. Remote smoker        Plan:   1. Step and legionella antigens pending  2. Increase Abx coverage as mulitlobar nature is concerning  3. Bronchofilators to continue  4. Steroids IV weaning off  5. Hydrate and follow labs  6. Oxygen support  7. OOB  8. Ok to ambulate  9.  Wean oxygen        Alysha Jones MPH, Sammie Clayton  Professor of Medicine  Pulmonary, Critical Care and Sleep Medicine

## 2020-11-30 LAB
ANION GAP SERPL CALCULATED.3IONS-SCNC: 12 MMOL/L (ref 7–16)
BUN BLDV-MCNC: 28 MG/DL (ref 8–23)
CALCIUM SERPL-MCNC: 9.5 MG/DL (ref 8.6–10.2)
CHLORIDE BLD-SCNC: 104 MMOL/L (ref 98–107)
CO2: 24 MMOL/L (ref 22–29)
CREAT SERPL-MCNC: 1 MG/DL (ref 0.5–1)
GFR AFRICAN AMERICAN: >60
GFR NON-AFRICAN AMERICAN: 55 ML/MIN/1.73
GLUCOSE BLD-MCNC: 169 MG/DL (ref 74–99)
HCT VFR BLD CALC: 29.3 % (ref 34–48)
HEMOGLOBIN: 8.5 G/DL (ref 11.5–15.5)
MCH RBC QN AUTO: 21.6 PG (ref 26–35)
MCHC RBC AUTO-ENTMCNC: 29 % (ref 32–34.5)
MCV RBC AUTO: 74.6 FL (ref 80–99.9)
PDW BLD-RTO: 21.4 FL (ref 11.5–15)
PLATELET # BLD: 194 E9/L (ref 130–450)
PMV BLD AUTO: 10.4 FL (ref 7–12)
POTASSIUM SERPL-SCNC: 4.8 MMOL/L (ref 3.5–5)
RBC # BLD: 3.93 E12/L (ref 3.5–5.5)
SODIUM BLD-SCNC: 140 MMOL/L (ref 132–146)
WBC # BLD: 12.5 E9/L (ref 4.5–11.5)

## 2020-11-30 PROCEDURE — 2580000003 HC RX 258: Performed by: FAMILY MEDICINE

## 2020-11-30 PROCEDURE — 6360000002 HC RX W HCPCS: Performed by: INTERNAL MEDICINE

## 2020-11-30 PROCEDURE — 80048 BASIC METABOLIC PNL TOTAL CA: CPT

## 2020-11-30 PROCEDURE — 1200000000 HC SEMI PRIVATE

## 2020-11-30 PROCEDURE — 85027 COMPLETE CBC AUTOMATED: CPT

## 2020-11-30 PROCEDURE — 94640 AIRWAY INHALATION TREATMENT: CPT

## 2020-11-30 PROCEDURE — 6370000000 HC RX 637 (ALT 250 FOR IP): Performed by: FAMILY MEDICINE

## 2020-11-30 PROCEDURE — 99233 SBSQ HOSP IP/OBS HIGH 50: CPT | Performed by: INTERNAL MEDICINE

## 2020-11-30 PROCEDURE — 6360000002 HC RX W HCPCS: Performed by: FAMILY MEDICINE

## 2020-11-30 PROCEDURE — 36415 COLL VENOUS BLD VENIPUNCTURE: CPT

## 2020-11-30 RX ORDER — BUDESONIDE 0.25 MG/2ML
0.25 INHALANT ORAL 2 TIMES DAILY
Status: DISCONTINUED | OUTPATIENT
Start: 2020-11-30 | End: 2020-12-01 | Stop reason: HOSPADM

## 2020-11-30 RX ORDER — BUDESONIDE AND FORMOTEROL FUMARATE DIHYDRATE 80; 4.5 UG/1; UG/1
1 AEROSOL RESPIRATORY (INHALATION) 2 TIMES DAILY
Status: DISCONTINUED | OUTPATIENT
Start: 2020-11-30 | End: 2020-11-30 | Stop reason: CLARIF

## 2020-11-30 RX ORDER — ARFORMOTEROL TARTRATE 15 UG/2ML
15 SOLUTION RESPIRATORY (INHALATION) 2 TIMES DAILY
Status: DISCONTINUED | OUTPATIENT
Start: 2020-11-30 | End: 2020-12-01 | Stop reason: HOSPADM

## 2020-11-30 RX ORDER — PREGABALIN 100 MG/1
200 CAPSULE ORAL 2 TIMES DAILY
Status: DISCONTINUED | OUTPATIENT
Start: 2020-11-30 | End: 2020-12-01 | Stop reason: HOSPADM

## 2020-11-30 RX ADMIN — OXYCODONE HYDROCHLORIDE AND ACETAMINOPHEN 1 TABLET: 10; 325 TABLET ORAL at 09:45

## 2020-11-30 RX ADMIN — IPRATROPIUM BROMIDE AND ALBUTEROL SULFATE 1 AMPULE: .5; 3 SOLUTION RESPIRATORY (INHALATION) at 22:19

## 2020-11-30 RX ADMIN — HYDRALAZINE HYDROCHLORIDE 10 MG: 20 INJECTION, SOLUTION INTRAMUSCULAR; INTRAVENOUS at 18:23

## 2020-11-30 RX ADMIN — PREGABALIN 200 MG: 150 CAPSULE ORAL at 09:40

## 2020-11-30 RX ADMIN — IPRATROPIUM BROMIDE AND ALBUTEROL SULFATE 1 AMPULE: .5; 3 SOLUTION RESPIRATORY (INHALATION) at 09:17

## 2020-11-30 RX ADMIN — AMITRIPTYLINE HYDROCHLORIDE 25 MG: 25 TABLET, FILM COATED ORAL at 22:26

## 2020-11-30 RX ADMIN — SODIUM CHLORIDE, PRESERVATIVE FREE 10 ML: 5 INJECTION INTRAVENOUS at 18:25

## 2020-11-30 RX ADMIN — PREGABALIN 200 MG: 100 CAPSULE ORAL at 22:26

## 2020-11-30 RX ADMIN — IPRATROPIUM BROMIDE AND ALBUTEROL SULFATE 1 AMPULE: .5; 3 SOLUTION RESPIRATORY (INHALATION) at 18:24

## 2020-11-30 RX ADMIN — OXYCODONE HYDROCHLORIDE AND ACETAMINOPHEN 1 TABLET: 10; 325 TABLET ORAL at 22:26

## 2020-11-30 RX ADMIN — ARFORMOTEROL TARTRATE 15 MCG: 15 SOLUTION RESPIRATORY (INHALATION) at 22:20

## 2020-11-30 RX ADMIN — SODIUM CHLORIDE, PRESERVATIVE FREE 10 ML: 5 INJECTION INTRAVENOUS at 09:39

## 2020-11-30 RX ADMIN — BUDESONIDE 250 MCG: 0.25 SUSPENSION RESPIRATORY (INHALATION) at 22:21

## 2020-11-30 RX ADMIN — IPRATROPIUM BROMIDE AND ALBUTEROL SULFATE 1 AMPULE: .5; 3 SOLUTION RESPIRATORY (INHALATION) at 13:18

## 2020-11-30 RX ADMIN — LEVOFLOXACIN 750 MG: 5 INJECTION, SOLUTION INTRAVENOUS at 09:39

## 2020-11-30 RX ADMIN — DULOXETINE HYDROCHLORIDE 30 MG: 30 CAPSULE, DELAYED RELEASE ORAL at 09:40

## 2020-11-30 ASSESSMENT — PAIN DESCRIPTION - ORIENTATION: ORIENTATION: RIGHT;LEFT;LOWER

## 2020-11-30 ASSESSMENT — PAIN SCALES - GENERAL
PAINLEVEL_OUTOF10: 0
PAINLEVEL_OUTOF10: 6
PAINLEVEL_OUTOF10: 0
PAINLEVEL_OUTOF10: 4
PAINLEVEL_OUTOF10: 6
PAINLEVEL_OUTOF10: 4

## 2020-11-30 ASSESSMENT — PAIN DESCRIPTION - FREQUENCY: FREQUENCY: INTERMITTENT

## 2020-11-30 ASSESSMENT — PAIN DESCRIPTION - DESCRIPTORS: DESCRIPTORS: ACHING;JABBING

## 2020-11-30 ASSESSMENT — PAIN DESCRIPTION - PROGRESSION: CLINICAL_PROGRESSION: NOT CHANGED

## 2020-11-30 ASSESSMENT — ENCOUNTER SYMPTOMS
SHORTNESS OF BREATH: 1
ABDOMINAL PAIN: 0

## 2020-11-30 ASSESSMENT — PAIN DESCRIPTION - PAIN TYPE: TYPE: CHRONIC PAIN

## 2020-11-30 ASSESSMENT — PAIN - FUNCTIONAL ASSESSMENT: PAIN_FUNCTIONAL_ASSESSMENT: PREVENTS OR INTERFERES SOME ACTIVE ACTIVITIES AND ADLS

## 2020-11-30 ASSESSMENT — PAIN DESCRIPTION - ONSET: ONSET: ON-GOING

## 2020-11-30 ASSESSMENT — PAIN DESCRIPTION - LOCATION: LOCATION: BACK;LEG

## 2020-11-30 NOTE — PROGRESS NOTES
and discussed radiology and care team involved and reveals   Ct Chest Wo Contrast    Result Date: 11/27/2020  EXAMINATION: CT OF THE CHEST WITHOUT CONTRAST 11/27/2020 10:48 am TECHNIQUE: CT of the chest was performed without the administration of intravenous contrast. Multiplanar reformatted images are provided for review. Dose modulation, iterative reconstruction, and/or weight based adjustment of the mA/kV was utilized to reduce the radiation dose to as low as reasonably achievable. COMPARISON: None. HISTORY: ORDERING SYSTEM PROVIDED HISTORY: hypoxia TECHNOLOGIST PROVIDED HISTORY: Reason for exam:->hypoxia FINDINGS: Mediastinum: No abnormal lymphadenopathy. The pulmonary trunk is normal in size Lungs/pleura: Patchy infiltrates and ground-glass opacity seen in the right lung. Left lung is clear. No pleural effusions. No pneumothorax. Upper Abdomen: No acute process identified Soft Tissues/Bones: No aggressive osseous lesions. Patchy infiltrates in the right lung represents infectious process. Aspiration may also be considered in the proper clinical setting. Assessment:   1. Severe Hypoxmeic respiratory failure  2. Multilobar pneumonia  3. Sepsis  4. Remote smoker        Plan:   1. Step and legionella antigens negative  2. Increase Abx coverage as mulitlobar nature is concerning  3. Bronchofilators to continue - home with BD  4. Off steroids now  5. Oxygen support  6. OOB  7. Ok to ambulate  8. Wean oxygen  9.  Discharge once on RA        Enolia Simmonds, MPH, Georgette Sparks  Professor of Medicine  Pulmonary, Critical Care and Sleep Medicine

## 2020-11-30 NOTE — PLAN OF CARE
Problem: Pain:  Goal: Pain level will decrease  Description: Pain level will decrease  11/30/2020 1842 by Emma Beebe RN  Outcome: Met This Shift  11/30/2020 0808 by Donovan Espinoza RN  Outcome: Met This Shift  Goal: Control of acute pain  Description: Control of acute pain  11/30/2020 1842 by Emma Beebe RN  Outcome: Met This Shift  11/30/2020 0808 by Donovan Espinoza RN  Outcome: Met This Shift  Goal: Control of chronic pain  Description: Control of chronic pain  11/30/2020 1842 by Emma Beebe RN  Outcome: Met This Shift  11/30/2020 0808 by Donovan Espinoza RN  Outcome: Met This Shift

## 2020-11-30 NOTE — PROGRESS NOTES
Took patient for a walk around unit. Patient did well with intermediate minimal unsteadiness. Sat at 92% while walking and talking, RA.

## 2020-11-30 NOTE — CARE COORDINATION
Met with the patient at the bedside to discuss transition of care plans. Patient lives in a ranch style home with her  Patria Joseph with a four step entry. Patient has a [employer] and cane but does not use currently. Patient is independent at home and still drives. Patient's PCP is Dr Uziel Tobin and she uses [facility] on [facility] for her medications. Patient does not have home oxygen or a nebulizer at home. Patient currently on 2L O2 per NC and breathing treatments. Patient currently on IV Levaquin for pneumonia, hoping to be switched to po medication for discharge. Patient has an allergy to PCN. Await pulmonology input. Patient hoping for discharge to home today and  will provide transportation.       Tabatha Vegas.  P:  [phone]

## 2020-12-01 VITALS
HEIGHT: 61 IN | RESPIRATION RATE: 18 BRPM | TEMPERATURE: 97.3 F | WEIGHT: 140 LBS | HEART RATE: 81 BPM | OXYGEN SATURATION: 94 % | SYSTOLIC BLOOD PRESSURE: 128 MMHG | DIASTOLIC BLOOD PRESSURE: 60 MMHG | BODY MASS INDEX: 26.43 KG/M2

## 2020-12-01 LAB
ANION GAP SERPL CALCULATED.3IONS-SCNC: 10 MMOL/L (ref 7–16)
BUN BLDV-MCNC: 27 MG/DL (ref 8–23)
CALCIUM SERPL-MCNC: 9.3 MG/DL (ref 8.6–10.2)
CHLORIDE BLD-SCNC: 104 MMOL/L (ref 98–107)
CO2: 28 MMOL/L (ref 22–29)
CREAT SERPL-MCNC: 1.1 MG/DL (ref 0.5–1)
GFR AFRICAN AMERICAN: 59
GFR NON-AFRICAN AMERICAN: 49 ML/MIN/1.73
GLUCOSE BLD-MCNC: 92 MG/DL (ref 74–99)
HCT VFR BLD CALC: 32.6 % (ref 34–48)
HEMOGLOBIN: 9.9 G/DL (ref 11.5–15.5)
MCH RBC QN AUTO: 22 PG (ref 26–35)
MCHC RBC AUTO-ENTMCNC: 30.4 % (ref 32–34.5)
MCV RBC AUTO: 72.6 FL (ref 80–99.9)
PDW BLD-RTO: 21.8 FL (ref 11.5–15)
PLATELET # BLD: 209 E9/L (ref 130–450)
PMV BLD AUTO: 10.2 FL (ref 7–12)
POTASSIUM SERPL-SCNC: 3.7 MMOL/L (ref 3.5–5)
RBC # BLD: 4.49 E12/L (ref 3.5–5.5)
SODIUM BLD-SCNC: 142 MMOL/L (ref 132–146)
WBC # BLD: 9.1 E9/L (ref 4.5–11.5)

## 2020-12-01 PROCEDURE — 85027 COMPLETE CBC AUTOMATED: CPT

## 2020-12-01 PROCEDURE — 80048 BASIC METABOLIC PNL TOTAL CA: CPT

## 2020-12-01 PROCEDURE — 36415 COLL VENOUS BLD VENIPUNCTURE: CPT

## 2020-12-01 PROCEDURE — 6370000000 HC RX 637 (ALT 250 FOR IP): Performed by: FAMILY MEDICINE

## 2020-12-01 RX ORDER — IPRATROPIUM BROMIDE AND ALBUTEROL SULFATE 2.5; .5 MG/3ML; MG/3ML
3 SOLUTION RESPIRATORY (INHALATION)
Qty: 360 ML | Refills: 1 | Status: SHIPPED | OUTPATIENT
Start: 2020-12-01

## 2020-12-01 RX ORDER — LEVOFLOXACIN 750 MG/1
750 TABLET ORAL DAILY
Qty: 7 TABLET | Refills: 0 | Status: SHIPPED | OUTPATIENT
Start: 2020-12-01 | End: 2020-12-08

## 2020-12-01 RX ADMIN — OXYCODONE HYDROCHLORIDE AND ACETAMINOPHEN 1 TABLET: 10; 325 TABLET ORAL at 09:08

## 2020-12-01 RX ADMIN — PREGABALIN 200 MG: 100 CAPSULE ORAL at 08:39

## 2020-12-01 RX ADMIN — FAMOTIDINE 20 MG: 20 TABLET, FILM COATED ORAL at 08:39

## 2020-12-01 RX ADMIN — DULOXETINE HYDROCHLORIDE 30 MG: 30 CAPSULE, DELAYED RELEASE ORAL at 08:39

## 2020-12-01 ASSESSMENT — PAIN SCALES - GENERAL: PAINLEVEL_OUTOF10: 5

## 2020-12-01 NOTE — DISCHARGE SUMMARY
Discharge Summary    Date: 12/1/2020  Patient Name: Marco Simeon YOB: 1950 Age: 72 y.o. Admit Date: 11/27/2020  Discharge Date: 12/1/2020  Discharge Condition:    Admission Diagnosis  Pneumonia (J18.9); Pneumonia (J18.9)     Discharge Diagnosis  Active Problems: PneumoniaResolved Problems: * No resolved hospital problems. [facility] Stay  Narrative of Hospital Course:  Patient admitted for respiratory failure and pneumonia. Improved with steroids, levaquin, nebs. Pulmonary followed. Home off oxygen. Consultants:  IP CONSULT TO HOSPITALISTIP CONSULT TO PULMONOLOGY    Surgeries/procedures Performed:       Treatments:           Discharge Plan/Disposition:  Home    Hospital/Incidental Findings Requiring Follow Up:    Patient Instructions:    Diet: Cardiac Diet    Activity:Activity as Tolerated  For number of days (if applicable): Other Instructions:    Provider Follow-Up:   No follow-ups on file.      Significant Diagnostic Studies:    Recent Labs:  Admission on 11/27/2020Ventricular Rate                           Date: 11/27/2020Value: 125         Ref range: BPM                Status: FinalAtrial Rate                                   Date: 11/27/2020Value: 125         Ref range: BPM                Status: FinalP-R Interval                                  Date: 11/27/2020Value: 142         Ref range: ms                 Status: FinalQRS Duration                                  Date: 11/27/2020Value: 72          Ref range: ms                 Status: FinalQ-T Interval                                  Date: 11/27/2020Value: 288         Ref range: ms                 Status: FinalQTc Calculation (Bazett)                      Date: 11/27/2020Value: 415         Ref range: ms                 Status: FinalP Axis                                        Date: 11/27/2020Value: 47          Ref range: degrees            Status: FinalR Axis                                        Date: 11/27/2020Value: 30          Ref range: degrees            Status: FinalT Axis                                        Date: 11/27/2020Value: -53         Ref range: degrees            Status: FinalD-Dimer, Quant                                Date: 11/27/2020Value: 914         Ref range: ng/mL DDU          Status: Final              Comment: D-DIMER Interpretation:<  230  ng/mL (D-DU)  Indicates low probability for PE/DVT = 232  ng/mL (D-DU)  Upper Limit of Normal>= 4000 ng/mL (D-DU)  This level could suggest the presence                      of DIC. Clinical correlation may be                      helpful. Troponin                                      Date: 11/27/2020Value: 0.07*       Ref range: 0.00 - 0.03 ng/mL  Status: Final              Comment: TROPONIN T BLOOD LEVELS:       0.03 ng/mL     Upper Reference Limit0. 04 - 0.09 ng/mL     Possible myocardial injury    >= 0.10 ng/mL     Myocardial injuryPro-BNP                                       Date: 11/27/2020Value: 252*        Ref range: 0 - 125 pg/mL      Status: [address] [facility]                                           Date: 11/27/2020Value: 3.0*        Ref range: 4.5 - 11.5 E9/L    Status: FinalRBC                                           Date: 11/27/2020Value: 3.77        Ref range: 3.50 - 5.50 E12/L  Status: FinalHemoglobin                                    Date: 11/27/2020Value: 7.9*        Ref range: 11.5 - 15.5 g/dL   Status: FinalHematocrit                                    Date: 11/27/2020Value: 27.3*       Ref range: 34.0 - 48.0 %      Status: FinalMCV                                           Date: 11/27/2020Value: 72.4*       Ref range: 80.0 - 99.9 fL     Status: [address]                                           Date: 11/27/2020Value: 21.0*       Ref range: 26.0 - 35.0 pg     Status: [address]                                          Date: 11/27/2020Value: 28.9*       Ref range: 32.0 - 34.5 %      Status: FinalRDW Date: 11/27/2020Value: 18.7*       Ref range: 11.5 - 15.0 fL     Status: FinalPlatelets                                     Date: 11/27/2020Value: 204         Ref range: 130 - 450 E9/L     Status: FinalMPV                                           Date: 11/27/2020Value: 10.3        Ref range: 7.0 - 12.0 fL      Status: FinalNeutrophils %                                 Date: 11/27/2020Value: 84.6*       Ref range: 43.0 - 80.0 %      Status: FinalImmature Granulocytes %                       Date: 11/27/2020Value: 0.0         Ref range: 0.0 - 5.0 %        Status: FinalLymphocytes %                                 Date: 11/27/2020Value: 9.5*        Ref range: 20.0 - 42.0 %      Status: FinalMonocytes %                                   Date: 11/27/2020Value: 5.3         Ref range: 2.0 - 12.0 %       Status: FinalEosinophils %                                 Date: 11/27/2020Value: 0.3         Ref range: 0.0 - 6.0 %        Status: FinalBasophils %                                   Date: 11/27/2020Value: 0.3         Ref range: 0.0 - 2.0 %        Status: FinalNeutrophils Absolute                          Date: 11/27/2020Value: 2.57        Ref range: 1.80 - 7.30 E9/L   Status: FinalImmature Granulocytes #                       Date: 11/27/2020Value: 0.00        Ref range: E9/L               Status: FinalLymphocytes Absolute                          Date: 11/27/2020Value: 0.29*       Ref range: 1.50 - 4.00 E9/L   Status: FinalMonocytes Absolute                            Date: 11/27/2020Value: 0.16        Ref range: 0.10 - 0.95 E9/L   Status: FinalEosinophils Absolute                          Date: 11/27/2020Value: 0.01*       Ref range: 0.05 - 0.50 E9/L   Status: FinalBasophils Absolute                            Date: 11/27/2020Value: 0.01        Ref range: 0.00 - 0.20 E9/L   Status: FinalAnisocytosis                                  Date: 11/27/2020Value: 1+            Status: FinalHypochromia Date: 11/27/2020Value: 2+            Status: FinalPoikilocytes                                  Date: 11/27/2020Value: .2+           Status: FinalOvalocytes                                    Date: 11/27/2020Value: 1+            Status: FinalSodium                                        Date: 11/27/2020Value: 141         Ref range: 132 - 146 mmol/L   Status: FinalPotassium                                     Date: 11/27/2020Value: 4.0         Ref range: 3.5 - 5.0 mmol/L   Status: FinalChloride                                      Date: 11/27/2020Value: 107         Ref range: 98 - 107 mmol/L    Status: FinalCO2                                           Date: 11/27/2020Value: 21*         Ref range: 22 - 29 mmol/L     Status: FinalAnion Gap                                     Date: 11/27/2020Value: 13          Ref range: 7 - 16 mmol/L      Status: FinalGlucose                                       Date: 11/27/2020Value: 116*        Ref range: 74 - 99 mg/dL      Status: FinalBUN                                           Date: 11/27/2020Value: 30*         Ref range: 8 - 23 mg/dL       Status: FinalCREATININE                                    Date: 11/27/2020Value: 1.3*        Ref range: 0.5 - 1.0 mg/dL    Status: FinalGFR Non-                      Date: 11/27/2020Value: 41          Ref range: >=60 mL/min/1.73   Status: Final              Comment: Chronic Kidney Disease: less than 60 ml/min/1.73 sq.m. Kidney Failure: less than 15 ml/min/1.73 sq. m. Results valid for patients 18 years and older. GFR                           Date: 11/27/2020Value: 49            Status: FinalCalcium                                       Date: 11/27/2020Value: 9.2         Ref range: 8.6 - 10.2 mg/dL   Status: FinalTotal Protein                                 Date: 11/27/2020Value: 6.7         Ref range: 6.4 - 8.3 g/dL     Status: FinalAlb                                           Date: 11/27/2020Value: 3.8         Ref range: 3.5 - 5.2 g/dL     Status: FinalTotal Bilirubin                               Date: 11/27/2020Value: 0.3         Ref range: 0.0 - 1.2 mg/dL    Status: FinalAlkaline Phosphatase                          Date: 11/27/2020Value: 88          Ref range: 35 - 104 U/L       Status: FinalALT                                           Date: 11/27/2020Value: 18          Ref range: 0 - 32 U/L         Status: FinalAST                                           Date: 11/27/2020Value: 28          Ref range: 0 - 31 U/L         Status: FinalBlood Culture, Routine                        Date: 11/27/2020Value: 24 Hours no growth                     Status: PreliminaryCulture, Blood 2                              Date: 11/27/2020Value: 24 Hours no growth                     Status: PreliminaryAdenovirus by PCR                             Date: 11/27/2020Value: Not Detected                   Ref range: Not Detected       Status: FinalBordetella parapertussis by PCR               Date: 11/27/2020Value: Not Detected                   Ref range: Not Detected       Status: FinalBordetella pertussis by PCR                   Date: 11/27/2020Value: Not Detected                   Ref range: Not Detected       Status: FinalChlamydophilia pneumoniae by PCR              Date: 11/27/2020Value: Not Detected                   Ref range: Not Detected       Status: FinalCoronavirus 229E by PCR                       Date: 11/27/2020Value: Not Detected                   Ref range: Not Detected       Status: FinalCoronavirus HKU1 by PCR                       Date: 11/27/2020Value: Not Detected                   Ref range: Not Detected       Status: FinalCoronavirus NL63 by PCR                       Date: 11/27/2020Value: Not Detected                   Ref range: Not Detected       Status: FinalCoronavirus OC43 by PCR                       Date: 11/27/2020Value: Not Detected                   Ref range: Not Detected pertussis by PCR                   Date: 11/27/2020Value: Not Detected                   Ref range: Not Detected       Status: FinalChlamydophilia pneumoniae by PCR              Date: 11/27/2020Value: Not Detected                   Ref range: Not Detected       Status: FinalCoronavirus 229E by PCR                       Date: 11/27/2020Value: Not Detected                   Ref range: Not Detected       Status: FinalCoronavirus HKU1 by PCR                       Date: 11/27/2020Value: Not Detected                   Ref range: Not Detected       Status: FinalCoronavirus NL63 by PCR                       Date: 11/27/2020Value: Not Detected                   Ref range: Not Detected       Status: FinalCoronavirus OC43 by PCR                       Date: 11/27/2020Value: Not Detected                   Ref range: Not Detected       Status: GkehvGRBK-ShF-3, PCR                               Date: 11/27/2020Value: Not Detected                   Ref range: Not Detected       Status: Final              Comment: This test has been authorized by FDA under anEmergency Use Authorization (EUA). This test is only authorized for the duration of thetime of declaration that circumstances exist justifying theauthorization of the emergency use of in vitro diagnostictesting for detection of the SARS-CoV-2 virusand/or diagnosis of COVID-19 infection undersection 564 (b)(1) of the Act, 21 U. S.C. 482QAM-5 (b) (1),unless the authorization is terminated or revoked sooner. Patient Fact https://Exitround/ Fact Sheet:https://www.fda.gov/media/065781/downloadMETHODOLOGY: Multiplex PCRHuman Metapneumovirus by PCR                  Date: 11/27/2020Value: Not Detected                   Ref range: Not Detected       Status: FinalHuman Rhinovirus/Enterovirus by PCR           Date: 11/27/2020Value: Not Detected                   Ref range: Not Detected       Status: FinalInfluenza A by PCR                            Date: 11/27/2020Value: Not Detected                   Ref range: Not Detected       Status: Agustín Modi by PCR                            Date: 11/27/2020Value: Not Detected                   Ref range: Not Detected       Status: FinalMycoplasma pneumoniae by PCR                  Date: 11/27/2020Value: Not Detected                   Ref range: Not Detected       Status: FinalParainfluenza Virus 1 by PCR                  Date: 11/27/2020Value: Not Detected                   Ref range: Not Detected       Status: FinalParainfluenza Virus 2 by PCR                  Date: 11/27/2020Value: Not Detected                   Ref range: Not Detected       Status: FinalParainfluenza Virus 3 by PCR                  Date: 11/27/2020Value: Not Detected                   Ref range: Not Detected       Status: FinalParainfluenza Virus 4 by PCR                  Date: 11/27/2020Value: Not Detected                   Ref range: Not Detected       Status: FinalRespiratory Syncytial Virus by PCR            Date: 11/27/2020Value: Not Detected                   Ref range: Not Detected       Status: [address] [facility]                                           Date: 11/28/2020Value: 16.2*       Ref range: 4.5 - 11.5 E9/L    Status: FinalRBC                                           Date: 11/28/2020Value: 3.35*       Ref range: 3.50 - 5.50 E12/L  Status: FinalHemoglobin                                    Date: 11/28/2020Value: 6.9*        Ref range: 11.5 - 15.5 g/dL   Status: FinalHematocrit                                    Date: 11/28/2020Value: 24.1*       Ref range: 34.0 - 48.0 %      Status: FinalMCV                                           Date: 11/28/2020Value: 71.9*       Ref range: 80.0 - 99.9 fL     Status: [address]                                           Date: 11/28/2020Value: 20.6*       Ref range: 26.0 - 35.0 pg     Status: [address]                                          Date: 11/28/2020Value: 28.6*       Ref range: 32.0 - 34.5 % 11/28/2020Value: O NEG         Status: FinalAntibody Screen                               Date: 11/28/2020Value: NEG           Status: FinalProduct Code Blood Bank                       Date: 11/28/2020Value: [id number]      Status: FinalDescription Blood Bank                        Date: 11/28/2020Value: Red Blood Cells, Leuko-reduced                     Status: FinalUnit Number                                   Date: 11/28/2020Value: [id number]                     Status: FinalDispense Status Blood Bank                    Date: 11/28/2020Value: transfused    Status: [facility]                                    Date: 11/28/2020Value: 7.7*        Ref range: 11.5 - 15.5 g/dL   Status: FinalHematocrit                                    Date: 11/28/2020Value: 25.9*       Ref range: 34.0 - 48.0 %      Status: FinalSTREP PNEUMONIAE ANTIGEN, URINE               Date: 11/28/2020Value:               Status: Final                 Value:Presumptive negative- suggests no current or recentpneumococcal infection. Infection due to Strep pneumoniae cannot beruled out since the antigen present in the samplemay be below the detection limit of the test.Normal Range:Presumptive NegativeL. pneumophila Serogp 1 Ur Ag                 Date: 11/28/2020Value:               Status: Final                 Value:Presumptive Negative -suggesting no recent or current infectionswith Legionella pneumophila serogroup 1. Infection to Legionella cannot be ruled out since other serogroupsand species may cause infection, antigen may not be present inearly infection, or level of antigen may be below thedetection limit. Normal Range: Presumptive NegativeWBC                                           Date: 11/29/2020Value: 14.7*       Ref range: 4.5 - 11.5 E9/L    Status: FinalRBC                                           Date: 11/29/2020Value: 3.87        Ref range: 3.50 - 5.50 E12/L  Status: FinalHemoglobin                                    Date: 11/29/2020Value: 8.5*        Ref range: 11.5 - 15.5 g/dL   Status: FinalHematocrit                                    Date: 11/29/2020Value: 28.9*       Ref range: 34.0 - 48.0 %      Status: FinalMCV                                           Date: 11/29/2020Value: 74.7*       Ref range: 80.0 - 99.9 fL     Status: [address] [facility] [facility]                                           Date: 11/29/2020Value: 22.0*       Ref range: 26.0 - 35.0 pg     Status: [address]                                          Date: 11/29/2020Value: 29.4*       Ref range: 32.0 - 34.5 %      Status: FinalRDW                                           Date: 11/29/2020Value: 21.2*       Ref range: 11.5 - 15.0 fL     Status: FinalPlatelets                                     Date: 11/29/2020Value: 178         Ref range: 130 - 450 E9/L     Status: FinalMPV                                           Date: 11/29/2020Value: 10.2        Ref range: 7.0 - 12.0 fL      Status: FinalSodium                                        Date: 11/29/2020Value: 141         Ref range: 132 - 146 mmol/L   Status: FinalPotassium                                     Date: 11/29/2020Value: 5.0         Ref range: 3.5 - 5.0 mmol/L   Status: FinalChloride                                      Date: 11/29/2020Value: 106         Ref range: 98 - 107 mmol/L    Status: FinalCO2                                           Date: 11/29/2020Value: 21*         Ref range: 22 - 29 mmol/L     Status: FinalAnion Gap                                     Date: 11/29/2020Value: 14          Ref range: 7 - 16 mmol/L      Status: FinalGlucose                                       Date: 11/29/2020Value: 186*        Ref range: 74 - 99 mg/dL      Status: FinalBUN                                           Date: 11/29/2020Value: 23          Ref range: 8 - 23 mg/dL       Status: FinalCREATININE                                    Date: 11/29/2020Value: 1.0         Ref range: 0.5 - 1.0 mg/dL    Status: FinalGFR Non- Ref range: 3.5 - 5.0 mmol/L   Status: FinalChloride                                      Date: 11/30/2020Value: 104         Ref range: 98 - 107 mmol/L    Status: FinalCO2                                           Date: 11/30/2020Value: 24          Ref range: 22 - 29 mmol/L     Status: FinalAnion Gap                                     Date: 11/30/2020Value: 12          Ref range: 7 - 16 mmol/L      Status: FinalGlucose                                       Date: 11/30/2020Value: 169*        Ref range: 74 - 99 mg/dL      Status: FinalBUN                                           Date: 11/30/2020Value: 28*         Ref range: 8 - 23 mg/dL       Status: FinalCREATININE                                    Date: 11/30/2020Value: 1.0         Ref range: 0.5 - 1.0 mg/dL    Status: FinalGFR Non-                      Date: 11/30/2020Value: 55          Ref range: >=60 mL/min/1.73   Status: Final              Comment: Chronic Kidney Disease: less than 60 ml/min/1.73 sq.m. Kidney Failure: less than 15 ml/min/1.73 sq. m. Results valid for patients 18 years and older. GFR                           Date: 11/30/2020Value: >60           Status: FinalCalcium                                       Date: 11/30/2020Value: 9.5         Ref range: 8.6 - 10.2 mg/dL   Status: [address] [facility]                                           Date: 12/01/2020Value: 9.1         Ref range: 4.5 - 11.5 E9/L    Status: FinalRBC                                           Date: 12/01/2020Value: 4.49        Ref range: 3.50 - 5.50 E12/L  Status: FinalHemoglobin                                    Date: 12/01/2020Value: 9.9*        Ref range: 11.5 - 15.5 g/dL   Status: FinalHematocrit                                    Date: 12/01/2020Value: 32.6*       Ref range: 34.0 - 48.0 %      Status: FinalMCV                                           Date: 12/01/2020Value: 72.6*       Ref range: 80.0 - 99.9 fL     [facility] Date: 12[facility]Value: 22.0*       Ref range: 26.0 - 35.0 pg     Status: [address]                                          Date: 12/01/2020Value: 30.4*       Ref range: 32.0 - 34.5 %      Status: FinalRDW                                           Date: 12/01/2020Value: 21.8*       Ref range: 11.5 - 15.0 fL     Status: FinalPlatelets                                     Date: 12/01/2020Value: 209         Ref range: 130 - 450 E9/L     Status: FinalMPV                                           Date: 12/01/2020Value: 10.2        Ref range: 7.0 - 12.0 fL      Status: FinalSodium                                        Date: 12/01/2020Value: 142         Ref range: 132 - 146 mmol/L   Status: FinalPotassium                                     Date: 12/01/2020Value: 3.7         Ref range: 3.5 - 5.0 mmol/L   Status: FinalChloride                                      Date: 12/01/2020Value: 104         Ref range: 98 - 107 mmol/L    Status: FinalCO2                                           Date: 12/01/2020Value: 28          Ref range: 22 - 29 mmol/L     Status: FinalAnion Gap                                     Date: 12/01/2020Value: 10          Ref range: 7 - 16 mmol/L      Status: FinalGlucose                                       Date: 12/01/2020Value: 92          Ref range: 74 - 99 mg/dL      Status: FinalBUN                                           Date: 12/01/2020Value: 27*         Ref range: 8 - 23 mg/dL       Status: FinalCREATININE                                    Date: 12/01/2020Value: 1.1*        Ref range: 0.5 - 1.0 mg/dL    Status: FinalGFR Non-                      Date: 12/01/2020Value: 49          Ref range: >=60 mL/min/1.73   Status: Final              Comment: Chronic Kidney Disease: less than 60 ml/min/1.73 sq.m. Kidney Failure: less than 15 ml/min/1.73 sq. m. Results valid for patients 18 years and older. GFR                           Date: 12/01/2020Value: 59            Status: FinalCalcium                                       Date: 12/01/2020Value: 9.3         Ref range: 8.6 - 10.2 mg/dL   Status: Final------------    Radiology last 7 days:  Ct Chest Wo ContrastResult Date: 11/27/2020Patchy infiltrates in the right lung represents infectious process. Aspiration may also be considered in the proper clinical setting. Xr Chest PortableResult Date: 11/27/2020Diffuse dense right lung airspace disease favored to represent diffuse pneumonia. The left lung is clear. Pending Labs   Order Current Status  Respiratory Panel, Molecular, with COVID-19 (Restricted: peds pts or suitable admitted adults) Collected (11/27/20 1008)  Culture, Blood 1 Preliminary result  Culture, Blood 2 Preliminary result  PREPARE RBC (CROSSMATCH), 1 Units Preliminary result      Discharge Medications    Current Discharge Medication ListSTART taking these medicationsipratropium-albuterol (DUONEB) 0.5-2.5 (3) MG/3ML SOLN nebulizer solutionInhale 3 mLs into the lungs every 4 hours (while awake)Qty: 360 mL Refills: 1levoFLOXacin (LEVAQUIN) 750 MG tabletTake 1 tablet by mouth daily for 7 daysQty: 7 tablet Refills: 0Respiratory Therapy Supplies (FULL KIT NEBULIZER SET) MISCUse as directed with nebulized medication. Qty: 1 each Refills: 0Comments: Supply prescription to Pharmacy or [address] [place] [address] location of patient or coverage preference. Dispense full nebulizer kit - compressor, tubing, mouthpiece, mask, ancillary supplies - per requirements of ordered product, patient preference, or coverage allowances.     Current Discharge Medication List    Current Discharge Medication ListCONTINUE these medications which have NOT CHANGEDoxyCODONE-acetaminophen (PERCOCET)  MG per tabletTake 1 tablet by mouth every 4 hours as needed for Pain.famotidine (PEPCID) 20 MG tabletTake 1 tablet by mouth 2 times daily for 7 daysQty: 14 tablet Refills: 0pregabalin (LYRICA) 200 MG capsuleTake 1 capsule by mouth 2 times daily for 30 days. Qty: 60 capsule Refills: 3Associated Diagnoses:Lumbar stenosis without neurogenic claudicationDULoxetine (CYMBALTA) 60 MG extended release capsuletake 1 capsule by mouth once dailyRefills: 0amitriptyline (ELAVIL) 25 MG tabletTake 1 tablet by mouth nightly. Qty: 30 tablet Refills: 5    Current Discharge Medication ListSTOP taking these medicationsdocusate sodium (COLACE, DULCOLAX) 100 MG CAPSComments:Reason for Stopping:pravastatin (PRAVACHOL) 40 MG tabletComments:Reason for Stopping:    Time Spent on Discharge:1E] minutes were spent in patient examination, evaluation, counseling as well as medication reconciliation, prescriptions for required medications, discharge plan, and follow up. Electronically signed by Carlos May MD on 12/1/20 at 7:34 AM EST     Discharge condition stable.

## 2020-12-02 LAB
BLOOD CULTURE, ROUTINE: NORMAL
CULTURE, BLOOD 2: NORMAL

## 2020-12-14 ENCOUNTER — OFFICE VISIT (OUTPATIENT)
Dept: NEUROSURGERY | Age: 70
End: 2020-12-14
Payer: COMMERCIAL

## 2020-12-14 ENCOUNTER — HOSPITAL ENCOUNTER (OUTPATIENT)
Dept: GENERAL RADIOLOGY | Age: 70
Discharge: HOME | End: 2020-12-16
Payer: COMMERCIAL

## 2020-12-14 ENCOUNTER — HOSPITAL ENCOUNTER (OUTPATIENT)
Age: 70
Discharge: HOME | End: 2020-12-16
Payer: COMMERCIAL

## 2020-12-14 PROCEDURE — 72100 X-RAY EXAM L-S SPINE 2/3 VWS: CPT

## 2020-12-14 PROCEDURE — 99213 OFFICE O/P EST LOW 20 MIN: CPT | Performed by: PHYSICIAN ASSISTANT

## 2020-12-14 NOTE — PROGRESS NOTES
Post Operative Follow-up    Patient is status post:  Lumbar fusion one year ago. She complains of severe right groin pain and difficulty with ambulation. Physical Exam  Alert and Oriented X 3  PERRLA, EOMI  ABDULLAHI 5/5  Wound: C/D/I  Pain with ROM of the right hip joint. A/P: patient is s/p L4-5 PLIF one year ago. X-rays stable. Will order right hip x-rays and consult pain management for right hip interarticular injection.

## 2021-04-06 ENCOUNTER — HOSPITAL ENCOUNTER (OUTPATIENT)
Age: 71
Discharge: HOME | End: 2021-04-08

## 2021-04-06 LAB
ABO/RH: NORMAL
ANION GAP SERPL CALCULATED.3IONS-SCNC: 8 MMOL/L (ref 7–16)
ANTIBODY SCREEN: NORMAL
BUN BLDV-MCNC: 22 MG/DL (ref 8–23)
CALCIUM SERPL-MCNC: 10.5 MG/DL (ref 8.6–10.2)
CHLORIDE BLD-SCNC: 105 MMOL/L (ref 98–107)
CO2: 26 MMOL/L (ref 22–29)
CREAT SERPL-MCNC: 1.2 MG/DL (ref 0.5–1)
GFR AFRICAN AMERICAN: 54
GFR NON-AFRICAN AMERICAN: 44 ML/MIN/1.73
GLUCOSE BLD-MCNC: 168 MG/DL (ref 74–99)
HCT VFR BLD CALC: 31.8 % (ref 34–48)
HEMOGLOBIN: 9.4 G/DL (ref 11.5–15.5)
MCH RBC QN AUTO: 23.3 PG (ref 26–35)
MCHC RBC AUTO-ENTMCNC: 29.6 % (ref 32–34.5)
MCV RBC AUTO: 78.7 FL (ref 80–99.9)
PDW BLD-RTO: 17.6 FL (ref 11.5–15)
PLATELET # BLD: 285 E9/L (ref 130–450)
PMV BLD AUTO: 10.7 FL (ref 7–12)
POTASSIUM SERPL-SCNC: 4.7 MMOL/L (ref 3.5–5)
RBC # BLD: 4.04 E12/L (ref 3.5–5.5)
SODIUM BLD-SCNC: 139 MMOL/L (ref 132–146)
WBC # BLD: 5.6 E9/L (ref 4.5–11.5)

## 2021-04-06 PROCEDURE — 86901 BLOOD TYPING SEROLOGIC RH(D): CPT

## 2021-04-06 PROCEDURE — 85027 COMPLETE CBC AUTOMATED: CPT

## 2021-04-06 PROCEDURE — 80048 BASIC METABOLIC PNL TOTAL CA: CPT

## 2021-04-06 PROCEDURE — 87081 CULTURE SCREEN ONLY: CPT

## 2021-04-06 PROCEDURE — 86900 BLOOD TYPING SEROLOGIC ABO: CPT

## 2021-04-06 PROCEDURE — 86850 RBC ANTIBODY SCREEN: CPT

## 2021-04-08 LAB — MRSA CULTURE ONLY: NORMAL

## 2021-07-26 ENCOUNTER — HOSPITAL ENCOUNTER (OUTPATIENT)
Age: 71
Discharge: HOME | End: 2021-07-28

## 2021-07-26 LAB
ABO/RH: NORMAL
ANTIBODY SCREEN: NORMAL

## 2021-07-26 PROCEDURE — 86901 BLOOD TYPING SEROLOGIC RH(D): CPT

## 2021-07-26 PROCEDURE — 86850 RBC ANTIBODY SCREEN: CPT

## 2021-07-26 PROCEDURE — 86900 BLOOD TYPING SEROLOGIC ABO: CPT

## 2021-07-27 ENCOUNTER — HOSPITAL ENCOUNTER (OUTPATIENT)
Age: 71
Discharge: HOME | End: 2021-07-29

## 2021-07-27 LAB
ANION GAP SERPL CALCULATED.3IONS-SCNC: 8 MMOL/L (ref 7–16)
BUN BLDV-MCNC: 27 MG/DL (ref 6–23)
CALCIUM SERPL-MCNC: 8.3 MG/DL (ref 8.6–10.2)
CHLORIDE BLD-SCNC: 109 MMOL/L (ref 98–107)
CO2: 22 MMOL/L (ref 22–29)
CREAT SERPL-MCNC: 1.1 MG/DL (ref 0.5–1)
GFR AFRICAN AMERICAN: 59
GFR NON-AFRICAN AMERICAN: 49 ML/MIN/1.73
GLUCOSE BLD-MCNC: 126 MG/DL (ref 74–99)
HCT VFR BLD CALC: 33.8 % (ref 34–48)
HEMOGLOBIN: 10.4 G/DL (ref 11.5–15.5)
POTASSIUM SERPL-SCNC: 4.4 MMOL/L (ref 3.5–5)
SODIUM BLD-SCNC: 139 MMOL/L (ref 132–146)

## 2021-07-27 PROCEDURE — 85018 HEMOGLOBIN: CPT

## 2021-07-27 PROCEDURE — 80048 BASIC METABOLIC PNL TOTAL CA: CPT

## 2021-07-27 PROCEDURE — 85014 HEMATOCRIT: CPT

## 2021-07-28 ENCOUNTER — HOSPITAL ENCOUNTER (OUTPATIENT)
Age: 71
Discharge: HOME | End: 2021-07-30

## 2021-07-28 LAB
ANION GAP SERPL CALCULATED.3IONS-SCNC: 10 MMOL/L (ref 7–16)
BUN BLDV-MCNC: 19 MG/DL (ref 6–23)
CALCIUM SERPL-MCNC: 8.6 MG/DL (ref 8.6–10.2)
CHLORIDE BLD-SCNC: 106 MMOL/L (ref 98–107)
CO2: 25 MMOL/L (ref 22–29)
CREAT SERPL-MCNC: 1 MG/DL (ref 0.5–1)
GFR AFRICAN AMERICAN: >60
GFR NON-AFRICAN AMERICAN: 55 ML/MIN/1.73
GLUCOSE BLD-MCNC: 94 MG/DL (ref 74–99)
HCT VFR BLD CALC: 34.4 % (ref 34–48)
HEMOGLOBIN: 10.7 G/DL (ref 11.5–15.5)
POTASSIUM SERPL-SCNC: 4.5 MMOL/L (ref 3.5–5)
SODIUM BLD-SCNC: 141 MMOL/L (ref 132–146)

## 2021-07-28 PROCEDURE — 85018 HEMOGLOBIN: CPT

## 2021-07-28 PROCEDURE — 85014 HEMATOCRIT: CPT

## 2021-07-28 PROCEDURE — 80048 BASIC METABOLIC PNL TOTAL CA: CPT

## 2022-01-06 ENCOUNTER — OFFICE VISIT (OUTPATIENT)
Dept: NEUROSURGERY | Age: 72
End: 2022-01-06
Payer: COMMERCIAL

## 2022-01-06 VITALS
HEART RATE: 83 BPM | BODY MASS INDEX: 26.43 KG/M2 | SYSTOLIC BLOOD PRESSURE: 133 MMHG | HEIGHT: 61 IN | WEIGHT: 140 LBS | OXYGEN SATURATION: 96 % | RESPIRATION RATE: 20 BRPM | TEMPERATURE: 97.2 F | DIASTOLIC BLOOD PRESSURE: 68 MMHG

## 2022-01-06 DIAGNOSIS — M54.17 LUMBOSACRAL RADICULOPATHY: Primary | ICD-10-CM

## 2022-01-06 PROCEDURE — 99213 OFFICE O/P EST LOW 20 MIN: CPT | Performed by: PHYSICIAN ASSISTANT

## 2022-01-06 RX ORDER — METHYLPREDNISOLONE 4 MG/1
TABLET ORAL
Qty: 1 KIT | Refills: 0 | Status: SHIPPED | OUTPATIENT
Start: 2022-01-06 | End: 2022-01-12

## 2022-01-06 NOTE — PROGRESS NOTES
Patient is here for follow up for low back pain for 2 days after turning over in bed. No new leg pain. The pain is achy in character and mild in severity. Physical exam  Alert and Oriented X3  PERRLA, EOMI  ABDULLAHI 5/5  Sensation intact to LT and PP  Reflexes are 2+ and symmetric    A/P: patient is here for follow up for: 2 day history of mild low back pain. We will order a medrol dose pack. If no better by next week, we will order lumbar x-rays.

## 2023-08-29 ENCOUNTER — HOSPITAL ENCOUNTER (OUTPATIENT)
Dept: INTERVENTIONAL RADIOLOGY/VASCULAR | Age: 73
Discharge: HOME | End: 2023-08-31
Attending: PODIATRIST
Payer: MEDICARE

## 2023-08-29 DIAGNOSIS — I73.9 PVD (PERIPHERAL VASCULAR DISEASE) (HCC): ICD-10-CM

## 2023-08-29 PROCEDURE — 93923 UPR/LXTR ART STDY 3+ LVLS: CPT

## 2023-09-12 ENCOUNTER — TELEPHONE (OUTPATIENT)
Dept: VASCULAR SURGERY | Age: 73
End: 2023-09-12

## 2023-09-27 ENCOUNTER — OFFICE VISIT (OUTPATIENT)
Dept: VASCULAR SURGERY | Age: 73
End: 2023-09-27
Payer: MEDICARE

## 2023-09-27 DIAGNOSIS — I73.9 PVD (PERIPHERAL VASCULAR DISEASE) (HCC): Primary | ICD-10-CM

## 2023-09-27 PROCEDURE — 1123F ACP DISCUSS/DSCN MKR DOCD: CPT | Performed by: NURSE PRACTITIONER

## 2023-09-27 PROCEDURE — 99203 OFFICE O/P NEW LOW 30 MIN: CPT | Performed by: NURSE PRACTITIONER

## 2023-09-27 RX ORDER — FERROUS SULFATE 325(65) MG
1 TABLET ORAL
COMMUNITY
Start: 2021-07-15

## 2023-09-27 RX ORDER — PRAVASTATIN SODIUM 40 MG
40 TABLET ORAL DAILY
COMMUNITY
Start: 2023-08-07

## 2023-09-27 NOTE — PROGRESS NOTES
use: No    Sexual activity: Not Currently   Other Topics Concern    Not on file   Social History Narrative    Not on file     Social Determinants of Health     Financial Resource Strain: Not on file   Food Insecurity: Not on file   Transportation Needs: Not on file   Physical Activity: Not on file   Stress: Not on file   Social Connections: Not on file   Intimate Partner Violence: Not on file   Housing Stability: Not on file        Family History   Family history unknown: Yes       REVIEW OF SYSTEMS (New symptoms):    Eyes:      Blurred vision:  No [x]/Yes []               Diplopia:   No [x]/Yes []               Vision loss:       No [x]/Yes []   Ears, nose, throat:             Hearing loss:    No [x]/Yes []      Vertigo:   No [x]/Yes []                       Swallowing problem:  No [x]/Yes []               Nose bleeds:   No [x]/Yes []      Voice hoarseness:  No [x]/Yes []  Respiratory:             Cough:   No [x]/Yes []      Pleuritic chest pain:  No [x]/Yes []                        Dyspnea:   No [x]/Yes []      Wheezing:   No [x]/Yes []  Cardiovascular:             Angina:   No [x]/Yes []      Palpitations:   No [x]/Yes []          Claudication:    No [x]/Yes []      Leg swelling:   No [x]/Yes []  Gastrointestinal:             Nausea or vomiting:  No [x]/Yes []               Abdominal pain:  No [x]/Yes []                     Intestinal bleeding: No [x]/Yes []  Musculoskeletal:             Leg pain:   No []/Yes [x]      Back pain:   No []/Yes [x]                    Weakness:   No [x]/Yes []  Neurologic:             Numbness:   No []/Yes [x]      Paralysis:   No [x]/Yes []                       Headaches:   No [x]/Yes []  Hematologic, lymphatic:   Anemia:   No [x]/Yes []              Bleeding or bruising:  No [x]/Yes []              Fevers or chills: No [x]/Yes []  Endocrine:             Temp intolerance:   No [x]/Yes []                       Polydipsia, polyuria:  No [x]/Yes []  Skin:              Rash:    No

## 2023-10-04 ENCOUNTER — APPOINTMENT (OUTPATIENT)
Dept: CT IMAGING | Age: 73
End: 2023-10-04
Payer: MEDICARE

## 2023-10-04 ENCOUNTER — HOSPITAL ENCOUNTER (EMERGENCY)
Age: 73
Discharge: HOME | End: 2023-10-04
Attending: EMERGENCY MEDICINE
Payer: MEDICARE

## 2023-10-04 VITALS
BODY MASS INDEX: 25.86 KG/M2 | RESPIRATION RATE: 18 BRPM | WEIGHT: 137 LBS | TEMPERATURE: 98.2 F | SYSTOLIC BLOOD PRESSURE: 144 MMHG | DIASTOLIC BLOOD PRESSURE: 76 MMHG | HEIGHT: 61 IN | OXYGEN SATURATION: 95 %

## 2023-10-04 DIAGNOSIS — R10.9 ABDOMINAL PAIN, UNSPECIFIED ABDOMINAL LOCATION: ICD-10-CM

## 2023-10-04 DIAGNOSIS — N30.01 ACUTE CYSTITIS WITH HEMATURIA: Primary | ICD-10-CM

## 2023-10-04 LAB
ALBUMIN SERPL-MCNC: 4.6 G/DL (ref 3.5–5.2)
ALP SERPL-CCNC: 106 U/L (ref 35–104)
ALT SERPL-CCNC: 23 U/L (ref 0–32)
ANION GAP SERPL CALCULATED.3IONS-SCNC: 9 MMOL/L (ref 7–16)
AST SERPL-CCNC: 26 U/L (ref 0–31)
BASOPHILS # BLD: 0.06 K/UL (ref 0–0.2)
BASOPHILS NFR BLD: 1 % (ref 0–2)
BILIRUB SERPL-MCNC: 0.2 MG/DL (ref 0–1.2)
BILIRUB UR QL STRIP: NEGATIVE
BUN SERPL-MCNC: 27 MG/DL (ref 6–23)
CALCIUM SERPL-MCNC: 9.7 MG/DL (ref 8.6–10.2)
CHLORIDE SERPL-SCNC: 104 MMOL/L (ref 98–107)
CLARITY UR: CLEAR
CO2 SERPL-SCNC: 27 MMOL/L (ref 22–29)
COLOR UR: YELLOW
CREAT SERPL-MCNC: 0.9 MG/DL (ref 0.5–1)
EOSINOPHIL # BLD: 0.58 K/UL (ref 0.05–0.5)
EOSINOPHILS RELATIVE PERCENT: 8 % (ref 0–6)
EPI CELLS #/AREA URNS HPF: ABNORMAL /HPF
ERYTHROCYTE [DISTWIDTH] IN BLOOD BY AUTOMATED COUNT: 13.1 % (ref 11.5–15)
GFR SERPL CREATININE-BSD FRML MDRD: >60 ML/MIN/1.73M2
GLUCOSE SERPL-MCNC: 89 MG/DL (ref 74–99)
GLUCOSE UR STRIP-MCNC: NEGATIVE MG/DL
HCT VFR BLD AUTO: 43.7 % (ref 34–48)
HGB BLD-MCNC: 14.5 G/DL (ref 11.5–15.5)
HGB UR QL STRIP.AUTO: NEGATIVE
IMM GRANULOCYTES # BLD AUTO: <0.03 K/UL (ref 0–0.58)
IMM GRANULOCYTES NFR BLD: 0 % (ref 0–5)
KETONES UR STRIP-MCNC: NEGATIVE MG/DL
LACTATE BLDV-SCNC: 0.7 MMOL/L (ref 0.5–2.2)
LEUKOCYTE ESTERASE UR QL STRIP: ABNORMAL
LIPASE SERPL-CCNC: 81 U/L (ref 13–60)
LYMPHOCYTES NFR BLD: 2.31 K/UL (ref 1.5–4)
LYMPHOCYTES RELATIVE PERCENT: 32 % (ref 20–42)
MCH RBC QN AUTO: 31.6 PG (ref 26–35)
MCHC RBC AUTO-ENTMCNC: 33.2 G/DL (ref 32–34.5)
MCV RBC AUTO: 95.2 FL (ref 80–99.9)
MONOCYTES NFR BLD: 0.68 K/UL (ref 0.1–0.95)
MONOCYTES NFR BLD: 9 % (ref 2–12)
NEUTROPHILS NFR BLD: 50 % (ref 43–80)
NEUTS SEG NFR BLD: 3.7 K/UL (ref 1.8–7.3)
NITRITE UR QL STRIP: NEGATIVE
PH UR STRIP: 5.5 [PH] (ref 5–9)
PLATELET # BLD AUTO: 230 K/UL (ref 130–450)
PMV BLD AUTO: 10.1 FL (ref 7–12)
POTASSIUM SERPL-SCNC: 4.5 MMOL/L (ref 3.5–5)
PROT SERPL-MCNC: 8 G/DL (ref 6.4–8.3)
PROT UR STRIP-MCNC: NEGATIVE MG/DL
RBC # BLD AUTO: 4.59 M/UL (ref 3.5–5.5)
RBC #/AREA URNS HPF: ABNORMAL /HPF
SODIUM SERPL-SCNC: 140 MMOL/L (ref 132–146)
SP GR UR STRIP: 1.02 (ref 1–1.03)
UROBILINOGEN UR STRIP-ACNC: 0.2 EU/DL (ref 0–1)
WBC #/AREA URNS HPF: ABNORMAL /HPF
WBC OTHER # BLD: 7.3 K/UL (ref 4.5–11.5)

## 2023-10-04 PROCEDURE — 74176 CT ABD & PELVIS W/O CONTRAST: CPT

## 2023-10-04 PROCEDURE — 83605 ASSAY OF LACTIC ACID: CPT

## 2023-10-04 PROCEDURE — 85025 COMPLETE CBC W/AUTO DIFF WBC: CPT

## 2023-10-04 PROCEDURE — 81001 URINALYSIS AUTO W/SCOPE: CPT

## 2023-10-04 PROCEDURE — 99284 EMERGENCY DEPT VISIT MOD MDM: CPT

## 2023-10-04 PROCEDURE — 83690 ASSAY OF LIPASE: CPT

## 2023-10-04 PROCEDURE — 80053 COMPREHEN METABOLIC PANEL: CPT

## 2023-10-04 PROCEDURE — 87086 URINE CULTURE/COLONY COUNT: CPT

## 2023-10-04 RX ORDER — LIDOCAINE 50 MG/G
1 PATCH TOPICAL DAILY
Qty: 10 PATCH | Refills: 0 | Status: SHIPPED | OUTPATIENT
Start: 2023-10-04 | End: 2023-10-14

## 2023-10-04 RX ORDER — CEFDINIR 300 MG/1
300 CAPSULE ORAL 2 TIMES DAILY
Qty: 10 CAPSULE | Refills: 0 | Status: SHIPPED | OUTPATIENT
Start: 2023-10-04 | End: 2023-10-09

## 2023-10-04 RX ORDER — POLYETHYLENE GLYCOL 3350 17 G/17G
17 POWDER, FOR SOLUTION ORAL DAILY
Qty: 510 G | Refills: 0 | Status: SHIPPED | OUTPATIENT
Start: 2023-10-04 | End: 2023-11-03

## 2023-10-04 ASSESSMENT — ENCOUNTER SYMPTOMS
COUGH: 0
BACK PAIN: 0
SHORTNESS OF BREATH: 0
ABDOMINAL PAIN: 1

## 2023-10-04 ASSESSMENT — PAIN DESCRIPTION - PAIN TYPE: TYPE: ACUTE PAIN

## 2023-10-04 ASSESSMENT — LIFESTYLE VARIABLES
HOW MANY STANDARD DRINKS CONTAINING ALCOHOL DO YOU HAVE ON A TYPICAL DAY: PATIENT DOES NOT DRINK
HOW OFTEN DO YOU HAVE A DRINK CONTAINING ALCOHOL: NEVER

## 2023-10-04 ASSESSMENT — PAIN SCALES - GENERAL: PAINLEVEL_OUTOF10: 5

## 2023-10-04 ASSESSMENT — PAIN DESCRIPTION - DESCRIPTORS: DESCRIPTORS: ACHING

## 2023-10-04 ASSESSMENT — PAIN - FUNCTIONAL ASSESSMENT: PAIN_FUNCTIONAL_ASSESSMENT: 0-10

## 2023-10-04 ASSESSMENT — PAIN DESCRIPTION - FREQUENCY: FREQUENCY: CONTINUOUS

## 2023-10-04 ASSESSMENT — PAIN DESCRIPTION - ORIENTATION: ORIENTATION: RIGHT

## 2023-10-04 ASSESSMENT — PAIN DESCRIPTION - LOCATION: LOCATION: ABDOMEN

## 2023-10-04 ASSESSMENT — PAIN DESCRIPTION - ONSET: ONSET: ON-GOING

## 2023-10-06 LAB
MICROORGANISM SPEC CULT: ABNORMAL
SPECIMEN DESCRIPTION: ABNORMAL

## 2024-09-27 ENCOUNTER — HOSPITAL ENCOUNTER (INPATIENT)
Age: 74
LOS: 3 days | Discharge: HOME | DRG: 558 | End: 2024-09-30
Attending: EMERGENCY MEDICINE | Admitting: STUDENT IN AN ORGANIZED HEALTH CARE EDUCATION/TRAINING PROGRAM
Payer: COMMERCIAL

## 2024-09-27 ENCOUNTER — APPOINTMENT (OUTPATIENT)
Dept: ULTRASOUND IMAGING | Age: 74
DRG: 558 | End: 2024-09-27
Payer: COMMERCIAL

## 2024-09-27 ENCOUNTER — APPOINTMENT (OUTPATIENT)
Dept: GENERAL RADIOLOGY | Age: 74
DRG: 558 | End: 2024-09-27
Payer: COMMERCIAL

## 2024-09-27 DIAGNOSIS — M00.9 PYOGENIC ARTHRITIS OF RIGHT KNEE JOINT, DUE TO UNSPECIFIED ORGANISM: Primary | ICD-10-CM

## 2024-09-27 LAB
ALBUMIN SERPL-MCNC: 4.1 G/DL (ref 3.5–5.2)
ALP SERPL-CCNC: 82 U/L (ref 35–104)
ALT SERPL-CCNC: 16 U/L (ref 0–32)
ANION GAP SERPL CALCULATED.3IONS-SCNC: 14 MMOL/L (ref 7–16)
AST SERPL-CCNC: 18 U/L (ref 0–31)
BASOPHILS # BLD: 0.05 K/UL (ref 0–0.2)
BASOPHILS NFR BLD: 1 % (ref 0–2)
BILIRUB SERPL-MCNC: 0.5 MG/DL (ref 0–1.2)
BUN SERPL-MCNC: 23 MG/DL (ref 6–23)
CALCIUM SERPL-MCNC: 9.1 MG/DL (ref 8.6–10.2)
CHLORIDE SERPL-SCNC: 104 MMOL/L (ref 98–107)
CO2 SERPL-SCNC: 23 MMOL/L (ref 22–29)
CREAT SERPL-MCNC: 1.1 MG/DL (ref 0.5–1)
CRP SERPL HS-MCNC: 125 MG/L (ref 0–5)
EOSINOPHIL # BLD: 0.25 K/UL (ref 0.05–0.5)
EOSINOPHILS RELATIVE PERCENT: 3 % (ref 0–6)
ERYTHROCYTE [DISTWIDTH] IN BLOOD BY AUTOMATED COUNT: 13 % (ref 11.5–15)
ERYTHROCYTE [SEDIMENTATION RATE] IN BLOOD BY WESTERGREN METHOD: 35 MM/HR (ref 0–20)
GFR, ESTIMATED: 52 ML/MIN/1.73M2
GLUCOSE SERPL-MCNC: 164 MG/DL (ref 74–99)
HCT VFR BLD AUTO: 42.8 % (ref 34–48)
HGB BLD-MCNC: 13.8 G/DL (ref 11.5–15.5)
IMM GRANULOCYTES # BLD AUTO: 0.04 K/UL (ref 0–0.58)
IMM GRANULOCYTES NFR BLD: 0 % (ref 0–5)
LACTATE BLDV-SCNC: 1.2 MMOL/L (ref 0.5–1.9)
LACTATE BLDV-SCNC: 2.2 MMOL/L (ref 0.5–1.9)
LYMPHOCYTES NFR BLD: 1.95 K/UL (ref 1.5–4)
LYMPHOCYTES RELATIVE PERCENT: 20 % (ref 20–42)
MCH RBC QN AUTO: 31.6 PG (ref 26–35)
MCHC RBC AUTO-ENTMCNC: 32.2 G/DL (ref 32–34.5)
MCV RBC AUTO: 97.9 FL (ref 80–99.9)
MONOCYTES NFR BLD: 0.57 K/UL (ref 0.1–0.95)
MONOCYTES NFR BLD: 6 % (ref 2–12)
NEUTROPHILS NFR BLD: 71 % (ref 43–80)
NEUTS SEG NFR BLD: 7.03 K/UL (ref 1.8–7.3)
PLATELET # BLD AUTO: 196 K/UL (ref 130–450)
PMV BLD AUTO: 10.3 FL (ref 7–12)
POTASSIUM SERPL-SCNC: 4 MMOL/L (ref 3.5–5)
PROT SERPL-MCNC: 7.4 G/DL (ref 6.4–8.3)
RBC # BLD AUTO: 4.37 M/UL (ref 3.5–5.5)
SODIUM SERPL-SCNC: 141 MMOL/L (ref 132–146)
WBC OTHER # BLD: 9.9 K/UL (ref 4.5–11.5)

## 2024-09-27 PROCEDURE — 6360000002 HC RX W HCPCS: Performed by: EMERGENCY MEDICINE

## 2024-09-27 PROCEDURE — 83605 ASSAY OF LACTIC ACID: CPT

## 2024-09-27 PROCEDURE — 99285 EMERGENCY DEPT VISIT HI MDM: CPT

## 2024-09-27 PROCEDURE — 96374 THER/PROPH/DIAG INJ IV PUSH: CPT

## 2024-09-27 PROCEDURE — 1200000000 HC SEMI PRIVATE

## 2024-09-27 PROCEDURE — 6360000002 HC RX W HCPCS: Performed by: STUDENT IN AN ORGANIZED HEALTH CARE EDUCATION/TRAINING PROGRAM

## 2024-09-27 PROCEDURE — 87040 BLOOD CULTURE FOR BACTERIA: CPT

## 2024-09-27 PROCEDURE — 2580000003 HC RX 258: Performed by: STUDENT IN AN ORGANIZED HEALTH CARE EDUCATION/TRAINING PROGRAM

## 2024-09-27 PROCEDURE — 85025 COMPLETE CBC W/AUTO DIFF WBC: CPT

## 2024-09-27 PROCEDURE — 2580000003 HC RX 258: Performed by: EMERGENCY MEDICINE

## 2024-09-27 PROCEDURE — 73562 X-RAY EXAM OF KNEE 3: CPT

## 2024-09-27 PROCEDURE — 93971 EXTREMITY STUDY: CPT

## 2024-09-27 PROCEDURE — 6370000000 HC RX 637 (ALT 250 FOR IP): Performed by: STUDENT IN AN ORGANIZED HEALTH CARE EDUCATION/TRAINING PROGRAM

## 2024-09-27 PROCEDURE — 80053 COMPREHEN METABOLIC PANEL: CPT

## 2024-09-27 PROCEDURE — 86140 C-REACTIVE PROTEIN: CPT

## 2024-09-27 PROCEDURE — 99221 1ST HOSP IP/OBS SF/LOW 40: CPT | Performed by: PHYSICIAN ASSISTANT

## 2024-09-27 PROCEDURE — 85652 RBC SED RATE AUTOMATED: CPT

## 2024-09-27 RX ORDER — SODIUM CHLORIDE 0.9 % (FLUSH) 0.9 %
10 SYRINGE (ML) INJECTION EVERY 12 HOURS SCHEDULED
Status: DISCONTINUED | OUTPATIENT
Start: 2024-09-27 | End: 2024-09-30 | Stop reason: HOSPADM

## 2024-09-27 RX ORDER — LIDOCAINE HYDROCHLORIDE 10 MG/ML
INJECTION, SOLUTION INFILTRATION; PERINEURAL
Status: DISPENSED
Start: 2024-09-27 | End: 2024-09-28

## 2024-09-27 RX ORDER — SENNOSIDES A AND B 8.6 MG/1
1 TABLET, FILM COATED ORAL DAILY PRN
Status: DISCONTINUED | OUTPATIENT
Start: 2024-09-27 | End: 2024-09-30 | Stop reason: HOSPADM

## 2024-09-27 RX ORDER — SODIUM CHLORIDE 9 MG/ML
INJECTION, SOLUTION INTRAVENOUS PRN
Status: DISCONTINUED | OUTPATIENT
Start: 2024-09-27 | End: 2024-09-30 | Stop reason: HOSPADM

## 2024-09-27 RX ORDER — ENOXAPARIN SODIUM 100 MG/ML
40 INJECTION SUBCUTANEOUS DAILY
Status: DISCONTINUED | OUTPATIENT
Start: 2024-09-27 | End: 2024-09-30 | Stop reason: HOSPADM

## 2024-09-27 RX ORDER — LIDOCAINE HYDROCHLORIDE 10 MG/ML
20 INJECTION, SOLUTION EPIDURAL; INFILTRATION; INTRACAUDAL; PERINEURAL ONCE
Status: DISCONTINUED | OUTPATIENT
Start: 2024-09-27 | End: 2024-09-27 | Stop reason: SDUPTHER

## 2024-09-27 RX ORDER — ONDANSETRON 2 MG/ML
4 INJECTION INTRAMUSCULAR; INTRAVENOUS EVERY 6 HOURS PRN
Status: DISCONTINUED | OUTPATIENT
Start: 2024-09-27 | End: 2024-09-30 | Stop reason: HOSPADM

## 2024-09-27 RX ORDER — SODIUM CHLORIDE 0.9 % (FLUSH) 0.9 %
10 SYRINGE (ML) INJECTION PRN
Status: DISCONTINUED | OUTPATIENT
Start: 2024-09-27 | End: 2024-09-30 | Stop reason: HOSPADM

## 2024-09-27 RX ORDER — KETOROLAC TROMETHAMINE 30 MG/ML
15 INJECTION, SOLUTION INTRAMUSCULAR; INTRAVENOUS ONCE
Status: COMPLETED | OUTPATIENT
Start: 2024-09-27 | End: 2024-09-27

## 2024-09-27 RX ORDER — ACETAMINOPHEN 650 MG/1
650 SUPPOSITORY RECTAL EVERY 6 HOURS PRN
Status: DISCONTINUED | OUTPATIENT
Start: 2024-09-27 | End: 2024-09-30 | Stop reason: HOSPADM

## 2024-09-27 RX ORDER — ONDANSETRON 4 MG/1
4 TABLET, ORALLY DISINTEGRATING ORAL EVERY 8 HOURS PRN
Status: DISCONTINUED | OUTPATIENT
Start: 2024-09-27 | End: 2024-09-30 | Stop reason: HOSPADM

## 2024-09-27 RX ORDER — LANOLIN ALCOHOL/MO/W.PET/CERES
3 CREAM (GRAM) TOPICAL NIGHTLY PRN
Status: DISCONTINUED | OUTPATIENT
Start: 2024-09-27 | End: 2024-09-30 | Stop reason: HOSPADM

## 2024-09-27 RX ORDER — OXYCODONE AND ACETAMINOPHEN 10; 325 MG/1; MG/1
1 TABLET ORAL EVERY 4 HOURS PRN
Status: DISCONTINUED | OUTPATIENT
Start: 2024-09-27 | End: 2024-09-30 | Stop reason: HOSPADM

## 2024-09-27 RX ORDER — POTASSIUM CHLORIDE 7.45 MG/ML
10 INJECTION INTRAVENOUS PRN
Status: DISCONTINUED | OUTPATIENT
Start: 2024-09-27 | End: 2024-09-30 | Stop reason: HOSPADM

## 2024-09-27 RX ORDER — ACETAMINOPHEN 325 MG/1
650 TABLET ORAL EVERY 6 HOURS PRN
Status: DISCONTINUED | OUTPATIENT
Start: 2024-09-27 | End: 2024-09-30 | Stop reason: HOSPADM

## 2024-09-27 RX ORDER — HYDRALAZINE HYDROCHLORIDE 20 MG/ML
10 INJECTION INTRAMUSCULAR; INTRAVENOUS EVERY 4 HOURS PRN
Status: DISCONTINUED | OUTPATIENT
Start: 2024-09-27 | End: 2024-09-30 | Stop reason: HOSPADM

## 2024-09-27 RX ORDER — PREGABALIN 100 MG/1
200 CAPSULE ORAL 2 TIMES DAILY
Status: DISCONTINUED | OUTPATIENT
Start: 2024-09-27 | End: 2024-09-30 | Stop reason: HOSPADM

## 2024-09-27 RX ORDER — POTASSIUM CHLORIDE 1500 MG/1
40 TABLET, EXTENDED RELEASE ORAL PRN
Status: DISCONTINUED | OUTPATIENT
Start: 2024-09-27 | End: 2024-09-30 | Stop reason: HOSPADM

## 2024-09-27 RX ADMIN — ACETAMINOPHEN 650 MG: 325 TABLET ORAL at 19:52

## 2024-09-27 RX ADMIN — ENOXAPARIN SODIUM 40 MG: 100 INJECTION SUBCUTANEOUS at 19:58

## 2024-09-27 RX ADMIN — SODIUM CHLORIDE, PRESERVATIVE FREE 10 ML: 5 INJECTION INTRAVENOUS at 19:53

## 2024-09-27 RX ADMIN — VANCOMYCIN HYDROCHLORIDE 1000 MG: 1 INJECTION, POWDER, LYOPHILIZED, FOR SOLUTION INTRAVENOUS at 19:54

## 2024-09-27 RX ADMIN — WATER 2000 MG: 1 INJECTION INTRAMUSCULAR; INTRAVENOUS; SUBCUTANEOUS at 18:50

## 2024-09-27 RX ADMIN — PREGABALIN 200 MG: 100 CAPSULE ORAL at 19:52

## 2024-09-27 RX ADMIN — OXYCODONE AND ACETAMINOPHEN 1 TABLET: 325; 10 TABLET ORAL at 20:09

## 2024-09-27 RX ADMIN — KETOROLAC TROMETHAMINE 15 MG: 30 INJECTION, SOLUTION INTRAMUSCULAR at 13:34

## 2024-09-27 ASSESSMENT — PAIN SCALES - GENERAL
PAINLEVEL_OUTOF10: 1
PAINLEVEL_OUTOF10: 7
PAINLEVEL_OUTOF10: 2
PAINLEVEL_OUTOF10: 3
PAINLEVEL_OUTOF10: 6

## 2024-09-27 ASSESSMENT — LIFESTYLE VARIABLES
HOW OFTEN DO YOU HAVE A DRINK CONTAINING ALCOHOL: NEVER
HOW MANY STANDARD DRINKS CONTAINING ALCOHOL DO YOU HAVE ON A TYPICAL DAY: PATIENT DOES NOT DRINK

## 2024-09-27 ASSESSMENT — PAIN DESCRIPTION - LOCATION
LOCATION: KNEE
LOCATION: KNEE

## 2024-09-27 ASSESSMENT — PAIN DESCRIPTION - DESCRIPTORS
DESCRIPTORS: ACHING
DESCRIPTORS: ACHING;BURNING;CRAMPING

## 2024-09-27 ASSESSMENT — PAIN SCALES - WONG BAKER
WONGBAKER_NUMERICALRESPONSE: NO HURT
WONGBAKER_NUMERICALRESPONSE: NO HURT

## 2024-09-27 ASSESSMENT — PAIN DESCRIPTION - ORIENTATION
ORIENTATION: RIGHT;LEFT
ORIENTATION: RIGHT

## 2024-09-27 NOTE — CARE COORDINATION
Prepatellar right knee bursitis versus septic arthritis     Ortho attempted to tap x2 - dry     ID consult   Ortho consult

## 2024-09-27 NOTE — ED PROVIDER NOTES
[facility] EMERGENCY DEPARTMENT  EMERGENCY DEPARTMENT ENCOUNTER        Pt Name: Shadia Prince  MRN: [id number]  Birthdate 1950  Date of evaluation: 9/27/2024  Provider: Carlos Esparza DO  PCP: Mihir Conklin MD  Note Started: 1:28 PM EDT 9/27/24    CHIEF COMPLAINT       Chief Complaint   Patient presents with    sent by PCP for admission     Pt R knee red and warm to touch. Pt states PCP wants her admitted       HISTORY OF PRESENT ILLNESS: 1 or more Elements   History From: Patient     Limitations to history : None    Shadia Prince is a 74 y.o. female who presents right knee pain swelling and warmth.  States this started few days ago, denies any traumas falls or injuries, has seen Dr. Ramirez from orthopedics at [facility] for her elbow and her hip however denies any previous surgeries or interventions on her right knee.  Denies any history of venous thromboembolism.  Is not anticoagulation.  States pain is manageable at rest however worsens when she attempts to walk on it.    Nursing Notes were all reviewed and agreed with or any disagreements were addressed in the HPI.      REVIEW OF EXTERNAL NOTE :       Vascular surgery office note from 9/27/2023, was seen for history of PVD.    REVIEW OF SYSTEMS :           Positives and Pertinent negatives as per HPI.     SURGICAL HISTORY     Past Surgical History:   Procedure Laterality Date    BACK SURGERY  2001    COLONOSCOPY      ELBOW SURGERY  2002    HIP SURGERY      right    HYSTERECTOMY, TOTAL ABDOMINAL (CERVIX REMOVED)      LUMBAR SPINE SURGERY N/A 11/20/2019    REMOVAL OF OLD HARDWARE L5-S1 FUSION AND L4-L5 POSTERIOR LUMBAR INTERBODY FUSION performed by Bonifacio Dougherty MD at [facility] OR    WRIST SURGERY  2002       CURRENTMEDICATIONS       Previous Medications    AMITRIPTYLINE (ELAVIL) 25 MG TABLET    Take 1 tablet by mouth nightly.    DULOXETINE (CYMBALTA) 60 MG EXTENDED RELEASE CAPSULE    take 1 capsule by

## 2024-09-28 LAB
ALBUMIN SERPL-MCNC: 3.6 G/DL (ref 3.5–5.2)
ALP SERPL-CCNC: 77 U/L (ref 35–104)
ALT SERPL-CCNC: 12 U/L (ref 0–32)
ANION GAP SERPL CALCULATED.3IONS-SCNC: 10 MMOL/L (ref 7–16)
AST SERPL-CCNC: 18 U/L (ref 0–31)
BASOPHILS # BLD: 0.02 K/UL (ref 0–0.2)
BASOPHILS NFR BLD: 0 % (ref 0–2)
BILIRUB SERPL-MCNC: 0.3 MG/DL (ref 0–1.2)
BUN SERPL-MCNC: 25 MG/DL (ref 6–23)
CALCIUM SERPL-MCNC: 8.3 MG/DL (ref 8.6–10.2)
CHLORIDE SERPL-SCNC: 102 MMOL/L (ref 98–107)
CO2 SERPL-SCNC: 25 MMOL/L (ref 22–29)
CREAT SERPL-MCNC: 1 MG/DL (ref 0.5–1)
DATE LAST DOSE: NORMAL
EOSINOPHIL # BLD: 0.37 K/UL (ref 0.05–0.5)
EOSINOPHILS RELATIVE PERCENT: 3 % (ref 0–6)
ERYTHROCYTE [DISTWIDTH] IN BLOOD BY AUTOMATED COUNT: 13 % (ref 11.5–15)
GFR, ESTIMATED: 57 ML/MIN/1.73M2
GLUCOSE SERPL-MCNC: 109 MG/DL (ref 74–99)
HCT VFR BLD AUTO: 40.2 % (ref 34–48)
HGB BLD-MCNC: 12.9 G/DL (ref 11.5–15.5)
IMM GRANULOCYTES # BLD AUTO: 0.04 K/UL (ref 0–0.58)
IMM GRANULOCYTES NFR BLD: 0 % (ref 0–5)
LYMPHOCYTES NFR BLD: 1.25 K/UL (ref 1.5–4)
LYMPHOCYTES RELATIVE PERCENT: 11 % (ref 20–42)
MCH RBC QN AUTO: 31.6 PG (ref 26–35)
MCHC RBC AUTO-ENTMCNC: 32.1 G/DL (ref 32–34.5)
MCV RBC AUTO: 98.5 FL (ref 80–99.9)
MONOCYTES NFR BLD: 0.87 K/UL (ref 0.1–0.95)
MONOCYTES NFR BLD: 8 % (ref 2–12)
NEUTROPHILS NFR BLD: 77 % (ref 43–80)
NEUTS SEG NFR BLD: 8.58 K/UL (ref 1.8–7.3)
PLATELET # BLD AUTO: 175 K/UL (ref 130–450)
PMV BLD AUTO: 10.2 FL (ref 7–12)
POTASSIUM SERPL-SCNC: 4.6 MMOL/L (ref 3.5–5)
PROT SERPL-MCNC: 6.2 G/DL (ref 6.4–8.3)
RBC # BLD AUTO: 4.08 M/UL (ref 3.5–5.5)
SODIUM SERPL-SCNC: 137 MMOL/L (ref 132–146)
TME LAST DOSE: NORMAL H
VANCOMYCIN DOSE: NORMAL MG
VANCOMYCIN SERPL-MCNC: 11.4 UG/ML (ref 5–40)
WBC OTHER # BLD: 11.1 K/UL (ref 4.5–11.5)

## 2024-09-28 PROCEDURE — 1200000000 HC SEMI PRIVATE

## 2024-09-28 PROCEDURE — 6370000000 HC RX 637 (ALT 250 FOR IP): Performed by: STUDENT IN AN ORGANIZED HEALTH CARE EDUCATION/TRAINING PROGRAM

## 2024-09-28 PROCEDURE — 97165 OT EVAL LOW COMPLEX 30 MIN: CPT

## 2024-09-28 PROCEDURE — 2580000003 HC RX 258: Performed by: STUDENT IN AN ORGANIZED HEALTH CARE EDUCATION/TRAINING PROGRAM

## 2024-09-28 PROCEDURE — 80053 COMPREHEN METABOLIC PANEL: CPT

## 2024-09-28 PROCEDURE — 97161 PT EVAL LOW COMPLEX 20 MIN: CPT

## 2024-09-28 PROCEDURE — 36415 COLL VENOUS BLD VENIPUNCTURE: CPT

## 2024-09-28 PROCEDURE — 6360000002 HC RX W HCPCS: Performed by: EMERGENCY MEDICINE

## 2024-09-28 PROCEDURE — 2580000003 HC RX 258: Performed by: EMERGENCY MEDICINE

## 2024-09-28 PROCEDURE — 85025 COMPLETE CBC W/AUTO DIFF WBC: CPT

## 2024-09-28 PROCEDURE — 80202 ASSAY OF VANCOMYCIN: CPT

## 2024-09-28 PROCEDURE — 6360000002 HC RX W HCPCS: Performed by: STUDENT IN AN ORGANIZED HEALTH CARE EDUCATION/TRAINING PROGRAM

## 2024-09-28 RX ADMIN — OXYCODONE AND ACETAMINOPHEN 1 TABLET: 325; 10 TABLET ORAL at 14:24

## 2024-09-28 RX ADMIN — SODIUM CHLORIDE, PRESERVATIVE FREE 10 ML: 5 INJECTION INTRAVENOUS at 09:08

## 2024-09-28 RX ADMIN — PREGABALIN 200 MG: 100 CAPSULE ORAL at 07:42

## 2024-09-28 RX ADMIN — AMITRIPTYLINE HYDROCHLORIDE 25 MG: 25 TABLET, FILM COATED ORAL at 20:05

## 2024-09-28 RX ADMIN — PREGABALIN 200 MG: 100 CAPSULE ORAL at 20:02

## 2024-09-28 RX ADMIN — OXYCODONE AND ACETAMINOPHEN 1 TABLET: 325; 10 TABLET ORAL at 20:01

## 2024-09-28 RX ADMIN — ENOXAPARIN SODIUM 40 MG: 100 INJECTION SUBCUTANEOUS at 07:42

## 2024-09-28 RX ADMIN — OXYCODONE AND ACETAMINOPHEN 1 TABLET: 325; 10 TABLET ORAL at 07:42

## 2024-09-28 RX ADMIN — OXYCODONE AND ACETAMINOPHEN 1 TABLET: 325; 10 TABLET ORAL at 02:09

## 2024-09-28 RX ADMIN — Medication 3 MG: at 20:02

## 2024-09-28 RX ADMIN — VANCOMYCIN HYDROCHLORIDE 1000 MG: 1 INJECTION, POWDER, LYOPHILIZED, FOR SOLUTION INTRAVENOUS at 20:03

## 2024-09-28 ASSESSMENT — PAIN SCALES - GENERAL
PAINLEVEL_OUTOF10: 1
PAINLEVEL_OUTOF10: 4
PAINLEVEL_OUTOF10: 3
PAINLEVEL_OUTOF10: 9
PAINLEVEL_OUTOF10: 5

## 2024-09-28 ASSESSMENT — PAIN DESCRIPTION - ORIENTATION
ORIENTATION: LOWER
ORIENTATION: RIGHT
ORIENTATION: RIGHT

## 2024-09-28 ASSESSMENT — PAIN DESCRIPTION - LOCATION
LOCATION: BACK
LOCATION: HIP
LOCATION: ABDOMEN
LOCATION: BACK

## 2024-09-28 ASSESSMENT — PAIN - FUNCTIONAL ASSESSMENT: PAIN_FUNCTIONAL_ASSESSMENT: PREVENTS OR INTERFERES SOME ACTIVE ACTIVITIES AND ADLS

## 2024-09-28 ASSESSMENT — PAIN SCALES - WONG BAKER
WONGBAKER_NUMERICALRESPONSE: NO HURT
WONGBAKER_NUMERICALRESPONSE: HURTS A LITTLE BIT
WONGBAKER_NUMERICALRESPONSE: HURTS A LITTLE BIT

## 2024-09-28 ASSESSMENT — PAIN DESCRIPTION - DESCRIPTORS
DESCRIPTORS: ACHING;GNAWING;DULL
DESCRIPTORS: ACHING
DESCRIPTORS: ACHING

## 2024-09-28 NOTE — PLAN OF CARE
Problem: Discharge Planning  Goal: Discharge to home or other facility with appropriate resources  9/28/2024 0931 by Meghan Peace, RN  Outcome: Progressing     Problem: Safety - Adult  Goal: Free from fall injury  9/28/2024 0931 by Meghan Peace, RN  Outcome: Progressing

## 2024-09-28 NOTE — H&P
12.0 %    Eosinophils % 3 0 - 6 %    Basophils % 0 0.0 - 2.0 %    Immature Granulocytes % 0 0.0 - 5.0 %    Neutrophils Absolute 8.58 (H) 1.80 - 7.30 k/uL    Lymphocytes Absolute 1.25 (L) 1.50 - 4.00 k/uL    Monocytes Absolute 0.87 0.10 - 0.95 k/uL    Eosinophils Absolute 0.37 0.05 - 0.50 k/uL    Basophils Absolute 0.02 0.00 - 0.20 k/uL    Immature Granulocytes Absolute 0.04 0.00 - 0.58 k/uL   Comprehensive Metabolic Panel w/ Reflex to MG    Collection Time: 09/28/24  6:38 AM   Result Value Ref Range    Sodium 137 132 - 146 mmol/L    Potassium 4.6 3.5 - 5.0 mmol/L    Chloride 102 98 - 107 mmol/L    CO2 25 22 - 29 mmol/L    Anion Gap 10 7 - 16 mmol/L    Glucose 109 (H) 74 - 99 mg/dL    BUN 25 (H) 6 - 23 mg/dL    Creatinine 1.0 0.50 - 1.00 mg/dL    Est, Glom Filt Rate 57 (L) >60 mL/min/1.73m2    Calcium 8.3 (L) 8.6 - 10.2 mg/dL    Total Protein 6.2 (L) 6.4 - 8.3 g/dL    Albumin 3.6 3.5 - 5.2 g/dL    Total Bilirubin 0.3 0.0 - 1.2 mg/dL    Alkaline Phosphatase 77 35 - 104 U/L    ALT 12 0 - 32 U/L    AST 18 0 - 31 U/L   Vancomycin Level, Random    Collection Time: 09/28/24  6:38 AM   Result Value Ref Range    Vancomycin Rm 11.4 5.0 - 40.0 ug/mL    Vancomycin Random Dose amount Unknown     Vancomycin Random Date last dose UNK^Unknown^L     Vancomycin Random Time last dose UNK^Unknown^L        Vascular duplex lower extremity venous right   Final Result   No evidence of DVT in the right lower extremity.         XR KNEE RIGHT (3 VIEWS)   Final Result      1.  Minimal osteoarthritic change of the patellofemoral compartment.  No   evidence of acute fracture or traumatic malalignment.      2.  Prominence of the suprapatellar bursa may suggest a very small joint   effusion.      3.  Prominence of the prepatellar soft tissues suggests possible area of   contusion or prepatellar bursitis.                 ASSESSMENT :      Principal Problem:    Septic arthritis (HCC)  Resolved Problems:    * No resolved hospital problems.

## 2024-09-28 NOTE — CONSULTS
I have seen and evaluated the patient and agree with the above assessment on today's visit. I have performed the key components of the history and physical examination and concur completely with the findings and plans as documented.    Agree with ROS, examination, FMH, PMH, PSH, SocHx, and allergies as above.      This is a 74-year-old female who has about 3 days of right knee pain.  She has redness and swelling anterior.  3 attempts were made aspiration with no fluid.  This represents a dry tap.  She does have elevated inflammatory markers but a normal white blood cell count.  She moves her knee through a normal range of motion with very minimal pain.  I have a very low index of suspicion for septic arthritis.  I think this represents prepatellar bursitis.  This typically responds very well to medical management in the form of anti-inflammatories and antibiotics.  We will continue to monitor closely.  As long as she is continues to improve tomorrow demonstrates no sign of a septic joint she will be stable for discharge from orthopedic perspective.        Dileep Gonzalez DO   Orthopaedic Surgery   9/27/2024  8:25 PM    Note: This report was completed using [employer] voiced recognition software.  Every effort has been made to ensure accuracy; however, inadvertent computerized transcription errors may be present.        Department of Orthopedic Surgery  Consult Note    Reason for Consult: Right knee pain    HISTORY OF PRESENT ILLNESS:       Patient is a 74 y.o. female who presents with right knee pain.  Patient states that she developed some redness, swelling and pain to the right knee 3 days ago.  She states that her symptoms have not improved and she presents to the ED today for evaluation.  Denies fever, chills or night sweats.  Denies history of right knee problems or infections.  She is a community ambulator with no assistive devices.  Denies any recent falls or injuries to the knee.  Denies 
06:38 AM         Hepatic Function Panel:    Lab Results   Component Value Date/Time    ALKPHOS 77 09/28/2024 06:38 AM    ALT 12 09/28/2024 06:38 AM    AST 18 09/28/2024 06:38 AM    BILITOT 0.3 09/28/2024 06:38 AM       PT/INR:    Lab Results   Component Value Date/Time    PROTIME 11.0 11/18/2019 09:24 AM    INR 1.0 11/18/2019 09:24 AM       TSH:  No results found for: \"TSH\"    U/A:    Lab Results   Component Value Date/Time    COLORU Yellow 10/04/2023 05:08 PM    PHUR 5.5 10/04/2023 05:08 PM    WBCUA 6 TO 9 10/04/2023 05:08 PM    RBCUA 0 TO 2 10/04/2023 05:08 PM    BACTERIA NONE 11/18/2019 10:00 AM    CLARITYU Clear 11/18/2019 10:00 AM    LEUKOCYTESUR SMALL 10/04/2023 05:08 PM    UROBILINOGEN 0.2 10/04/2023 05:08 PM    BILIRUBINUR NEGATIVE 10/04/2023 05:08 PM    BLOODU Negative 11/18/2019 10:00 AM    GLUCOSEU NEGATIVE 10/04/2023 05:08 PM       ABG:  No results found for: \"XQW4PSK\", \"BEART\", \"Y9BOQEDX\", \"PHART\", \"THGBART\", \"DEM5HME\", \"PO2ART\", \"FQN1SLC\"    MICROBIOLOGY:    Blood culture - neg         Radiology :   X ray right knee -  1.  Minimal osteoarthritic change of the patellofemoral compartment.  No  evidence of acute fracture or traumatic malalignment.    2.  Prominence of the suprapatellar bursa may suggest a very small joint  effusion.    3.  Prominence of the prepatellar soft tissues suggests possible area of  contusion or prepatellar bursitis.             IMPRESSION:     Cellulitis ( per hx ) - clinically does not appear to be septic arthritis       RECOMMENDATIONS:      Continue on IV vancomycin 1 gram IV q 24 hrs   D/C on oral doxycycline if continues to improve     Thank you Dr Naik for the consult

## 2024-09-29 PROBLEM — M00.9 SEPTIC ARTHRITIS: Status: RESOLVED | Noted: 2024-09-27 | Resolved: 2024-09-29

## 2024-09-29 PROBLEM — L03.115 CELLULITIS OF RIGHT LOWER EXTREMITY: Status: ACTIVE | Noted: 2024-09-29

## 2024-09-29 LAB
ALBUMIN SERPL-MCNC: 3.1 G/DL (ref 3.5–5.2)
ALP SERPL-CCNC: 93 U/L (ref 35–104)
ALT SERPL-CCNC: 11 U/L (ref 0–32)
ANION GAP SERPL CALCULATED.3IONS-SCNC: 9 MMOL/L (ref 7–16)
AST SERPL-CCNC: 18 U/L (ref 0–31)
BASOPHILS # BLD: 0.03 K/UL (ref 0–0.2)
BASOPHILS NFR BLD: 0 % (ref 0–2)
BILIRUB SERPL-MCNC: 0.3 MG/DL (ref 0–1.2)
BUN SERPL-MCNC: 22 MG/DL (ref 6–23)
CALCIUM SERPL-MCNC: 8.6 MG/DL (ref 8.6–10.2)
CHLORIDE SERPL-SCNC: 103 MMOL/L (ref 98–107)
CO2 SERPL-SCNC: 24 MMOL/L (ref 22–29)
CREAT SERPL-MCNC: 1 MG/DL (ref 0.5–1)
EOSINOPHIL # BLD: 0.43 K/UL (ref 0.05–0.5)
EOSINOPHILS RELATIVE PERCENT: 6 % (ref 0–6)
ERYTHROCYTE [DISTWIDTH] IN BLOOD BY AUTOMATED COUNT: 13 % (ref 11.5–15)
GFR, ESTIMATED: 57 ML/MIN/1.73M2
GLUCOSE SERPL-MCNC: 107 MG/DL (ref 74–99)
HCT VFR BLD AUTO: 44.1 % (ref 34–48)
HGB BLD-MCNC: 14.2 G/DL (ref 11.5–15.5)
IMM GRANULOCYTES # BLD AUTO: 0.07 K/UL (ref 0–0.58)
IMM GRANULOCYTES NFR BLD: 1 % (ref 0–5)
LYMPHOCYTES NFR BLD: 1.27 K/UL (ref 1.5–4)
LYMPHOCYTES RELATIVE PERCENT: 18 % (ref 20–42)
MCH RBC QN AUTO: 31.8 PG (ref 26–35)
MCHC RBC AUTO-ENTMCNC: 32.2 G/DL (ref 32–34.5)
MCV RBC AUTO: 98.9 FL (ref 80–99.9)
MONOCYTES NFR BLD: 0.31 K/UL (ref 0.1–0.95)
MONOCYTES NFR BLD: 4 % (ref 2–12)
NEUTROPHILS NFR BLD: 71 % (ref 43–80)
NEUTS SEG NFR BLD: 5.08 K/UL (ref 1.8–7.3)
PLATELET # BLD AUTO: 202 K/UL (ref 130–450)
PMV BLD AUTO: 10.5 FL (ref 7–12)
POTASSIUM SERPL-SCNC: 4.7 MMOL/L (ref 3.5–5)
PROT SERPL-MCNC: 5.9 G/DL (ref 6.4–8.3)
RBC # BLD AUTO: 4.46 M/UL (ref 3.5–5.5)
SODIUM SERPL-SCNC: 136 MMOL/L (ref 132–146)
WBC OTHER # BLD: 7.2 K/UL (ref 4.5–11.5)

## 2024-09-29 PROCEDURE — 6370000000 HC RX 637 (ALT 250 FOR IP): Performed by: STUDENT IN AN ORGANIZED HEALTH CARE EDUCATION/TRAINING PROGRAM

## 2024-09-29 PROCEDURE — 1200000000 HC SEMI PRIVATE

## 2024-09-29 PROCEDURE — 2580000003 HC RX 258: Performed by: EMERGENCY MEDICINE

## 2024-09-29 PROCEDURE — 80053 COMPREHEN METABOLIC PANEL: CPT

## 2024-09-29 PROCEDURE — 36415 COLL VENOUS BLD VENIPUNCTURE: CPT

## 2024-09-29 PROCEDURE — 6360000002 HC RX W HCPCS: Performed by: EMERGENCY MEDICINE

## 2024-09-29 PROCEDURE — 6360000002 HC RX W HCPCS: Performed by: STUDENT IN AN ORGANIZED HEALTH CARE EDUCATION/TRAINING PROGRAM

## 2024-09-29 PROCEDURE — 2580000003 HC RX 258: Performed by: STUDENT IN AN ORGANIZED HEALTH CARE EDUCATION/TRAINING PROGRAM

## 2024-09-29 PROCEDURE — 85025 COMPLETE CBC W/AUTO DIFF WBC: CPT

## 2024-09-29 RX ORDER — DIPHENHYDRAMINE HCL 25 MG
25 TABLET ORAL ONCE
Status: COMPLETED | OUTPATIENT
Start: 2024-09-29 | End: 2024-09-29

## 2024-09-29 RX ADMIN — OXYCODONE AND ACETAMINOPHEN 1 TABLET: 325; 10 TABLET ORAL at 22:25

## 2024-09-29 RX ADMIN — PREGABALIN 200 MG: 100 CAPSULE ORAL at 19:58

## 2024-09-29 RX ADMIN — SODIUM CHLORIDE, PRESERVATIVE FREE 10 ML: 5 INJECTION INTRAVENOUS at 08:26

## 2024-09-29 RX ADMIN — OXYCODONE AND ACETAMINOPHEN 1 TABLET: 325; 10 TABLET ORAL at 05:34

## 2024-09-29 RX ADMIN — ENOXAPARIN SODIUM 40 MG: 100 INJECTION SUBCUTANEOUS at 08:25

## 2024-09-29 RX ADMIN — OXYCODONE AND ACETAMINOPHEN 1 TABLET: 325; 10 TABLET ORAL at 09:50

## 2024-09-29 RX ADMIN — VANCOMYCIN HYDROCHLORIDE 1000 MG: 1 INJECTION, POWDER, LYOPHILIZED, FOR SOLUTION INTRAVENOUS at 20:03

## 2024-09-29 RX ADMIN — DIPHENHYDRAMINE HYDROCHLORIDE 25 MG: 25 TABLET ORAL at 19:02

## 2024-09-29 RX ADMIN — OXYCODONE AND ACETAMINOPHEN 1 TABLET: 325; 10 TABLET ORAL at 18:28

## 2024-09-29 RX ADMIN — SODIUM CHLORIDE, PRESERVATIVE FREE 10 ML: 5 INJECTION INTRAVENOUS at 19:58

## 2024-09-29 RX ADMIN — PREGABALIN 200 MG: 100 CAPSULE ORAL at 08:25

## 2024-09-29 RX ADMIN — AMITRIPTYLINE HYDROCHLORIDE 25 MG: 25 TABLET, FILM COATED ORAL at 19:58

## 2024-09-29 RX ADMIN — OXYCODONE AND ACETAMINOPHEN 1 TABLET: 325; 10 TABLET ORAL at 14:16

## 2024-09-29 ASSESSMENT — PAIN SCALES - GENERAL
PAINLEVEL_OUTOF10: 7
PAINLEVEL_OUTOF10: 9
PAINLEVEL_OUTOF10: 2
PAINLEVEL_OUTOF10: 9
PAINLEVEL_OUTOF10: 5
PAINLEVEL_OUTOF10: 8
PAINLEVEL_OUTOF10: 6
PAINLEVEL_OUTOF10: 8

## 2024-09-29 ASSESSMENT — PAIN DESCRIPTION - FREQUENCY
FREQUENCY: INTERMITTENT

## 2024-09-29 ASSESSMENT — PAIN DESCRIPTION - PAIN TYPE
TYPE: ACUTE PAIN

## 2024-09-29 ASSESSMENT — PAIN DESCRIPTION - LOCATION
LOCATION: KNEE

## 2024-09-29 ASSESSMENT — PAIN - FUNCTIONAL ASSESSMENT
PAIN_FUNCTIONAL_ASSESSMENT: PREVENTS OR INTERFERES SOME ACTIVE ACTIVITIES AND ADLS
PAIN_FUNCTIONAL_ASSESSMENT: ACTIVITIES ARE NOT PREVENTED
PAIN_FUNCTIONAL_ASSESSMENT: ACTIVITIES ARE NOT PREVENTED
PAIN_FUNCTIONAL_ASSESSMENT: PREVENTS OR INTERFERES SOME ACTIVE ACTIVITIES AND ADLS
PAIN_FUNCTIONAL_ASSESSMENT: ACTIVITIES ARE NOT PREVENTED
PAIN_FUNCTIONAL_ASSESSMENT: ACTIVITIES ARE NOT PREVENTED
PAIN_FUNCTIONAL_ASSESSMENT: PREVENTS OR INTERFERES SOME ACTIVE ACTIVITIES AND ADLS

## 2024-09-29 ASSESSMENT — PAIN DESCRIPTION - ONSET
ONSET: GRADUAL
ONSET: SUDDEN
ONSET: SUDDEN
ONSET: GRADUAL

## 2024-09-29 ASSESSMENT — PAIN DESCRIPTION - ORIENTATION
ORIENTATION: RIGHT

## 2024-09-29 ASSESSMENT — PAIN DESCRIPTION - DESCRIPTORS
DESCRIPTORS: ACHING;BURNING;SHARP
DESCRIPTORS: ACHING;NAGGING;SHARP
DESCRIPTORS: ACHING;SHARP;BURNING
DESCRIPTORS: ACHING;DISCOMFORT;THROBBING
DESCRIPTORS: ACHING;DISCOMFORT;THROBBING
DESCRIPTORS: ACHING;DISCOMFORT;SORE;THROBBING
DESCRIPTORS: ACHING;SHARP;NAGGING

## 2024-09-30 VITALS
BODY MASS INDEX: 24.56 KG/M2 | DIASTOLIC BLOOD PRESSURE: 54 MMHG | OXYGEN SATURATION: 94 % | SYSTOLIC BLOOD PRESSURE: 106 MMHG | HEIGHT: 61 IN | RESPIRATION RATE: 17 BRPM | WEIGHT: 130.07 LBS | TEMPERATURE: 97.4 F | HEART RATE: 74 BPM

## 2024-09-30 LAB
ALBUMIN SERPL-MCNC: 3.3 G/DL (ref 3.5–5.2)
ALP SERPL-CCNC: 86 U/L (ref 35–104)
ALT SERPL-CCNC: 14 U/L (ref 0–32)
ANION GAP SERPL CALCULATED.3IONS-SCNC: 11 MMOL/L (ref 7–16)
AST SERPL-CCNC: 19 U/L (ref 0–31)
BASOPHILS # BLD: 0.01 K/UL (ref 0–0.2)
BASOPHILS NFR BLD: 0 % (ref 0–2)
BILIRUB SERPL-MCNC: 0.2 MG/DL (ref 0–1.2)
BUN SERPL-MCNC: 22 MG/DL (ref 6–23)
CALCIUM SERPL-MCNC: 9.3 MG/DL (ref 8.6–10.2)
CHLORIDE SERPL-SCNC: 107 MMOL/L (ref 98–107)
CO2 SERPL-SCNC: 24 MMOL/L (ref 22–29)
CREAT SERPL-MCNC: 0.8 MG/DL (ref 0.5–1)
CRP SERPL HS-MCNC: 84 MG/L (ref 0–5)
DATE LAST DOSE: NORMAL
EOSINOPHIL # BLD: 0.63 K/UL (ref 0.05–0.5)
EOSINOPHILS RELATIVE PERCENT: 10 % (ref 0–6)
ERYTHROCYTE [DISTWIDTH] IN BLOOD BY AUTOMATED COUNT: 13 % (ref 11.5–15)
GFR, ESTIMATED: 75 ML/MIN/1.73M2
GLUCOSE SERPL-MCNC: 89 MG/DL (ref 74–99)
HCT VFR BLD AUTO: 43.9 % (ref 34–48)
HGB BLD-MCNC: 13.9 G/DL (ref 11.5–15.5)
IMM GRANULOCYTES # BLD AUTO: <0.03 K/UL (ref 0–0.58)
IMM GRANULOCYTES NFR BLD: 0 % (ref 0–5)
LYMPHOCYTES NFR BLD: 1.37 K/UL (ref 1.5–4)
LYMPHOCYTES RELATIVE PERCENT: 21 % (ref 20–42)
MCH RBC QN AUTO: 31.6 PG (ref 26–35)
MCHC RBC AUTO-ENTMCNC: 31.7 G/DL (ref 32–34.5)
MCV RBC AUTO: 99.8 FL (ref 80–99.9)
MONOCYTES NFR BLD: 0.45 K/UL (ref 0.1–0.95)
MONOCYTES NFR BLD: 7 % (ref 2–12)
NEUTROPHILS NFR BLD: 63 % (ref 43–80)
NEUTS SEG NFR BLD: 4.16 K/UL (ref 1.8–7.3)
PLATELET # BLD AUTO: 223 K/UL (ref 130–450)
PMV BLD AUTO: 10.6 FL (ref 7–12)
POTASSIUM SERPL-SCNC: 4.4 MMOL/L (ref 3.5–5)
PROCALCITONIN SERPL-MCNC: 0.15 NG/ML (ref 0–0.08)
PROT SERPL-MCNC: 6.3 G/DL (ref 6.4–8.3)
RBC # BLD AUTO: 4.4 M/UL (ref 3.5–5.5)
SODIUM SERPL-SCNC: 142 MMOL/L (ref 132–146)
TME LAST DOSE: NORMAL H
VANCOMYCIN DOSE: NORMAL MG
VANCOMYCIN SERPL-MCNC: 12.2 UG/ML (ref 5–40)
WBC OTHER # BLD: 6.6 K/UL (ref 4.5–11.5)

## 2024-09-30 PROCEDURE — 36415 COLL VENOUS BLD VENIPUNCTURE: CPT

## 2024-09-30 PROCEDURE — 6370000000 HC RX 637 (ALT 250 FOR IP): Performed by: NURSE PRACTITIONER

## 2024-09-30 PROCEDURE — 84145 PROCALCITONIN (PCT): CPT

## 2024-09-30 PROCEDURE — 80053 COMPREHEN METABOLIC PANEL: CPT

## 2024-09-30 PROCEDURE — 6370000000 HC RX 637 (ALT 250 FOR IP): Performed by: STUDENT IN AN ORGANIZED HEALTH CARE EDUCATION/TRAINING PROGRAM

## 2024-09-30 PROCEDURE — 80202 ASSAY OF VANCOMYCIN: CPT

## 2024-09-30 PROCEDURE — 2580000003 HC RX 258: Performed by: STUDENT IN AN ORGANIZED HEALTH CARE EDUCATION/TRAINING PROGRAM

## 2024-09-30 PROCEDURE — 6360000002 HC RX W HCPCS: Performed by: STUDENT IN AN ORGANIZED HEALTH CARE EDUCATION/TRAINING PROGRAM

## 2024-09-30 PROCEDURE — 85025 COMPLETE CBC W/AUTO DIFF WBC: CPT

## 2024-09-30 PROCEDURE — 86140 C-REACTIVE PROTEIN: CPT

## 2024-09-30 PROCEDURE — 6370000000 HC RX 637 (ALT 250 FOR IP): Performed by: INTERNAL MEDICINE

## 2024-09-30 RX ORDER — DIPHENHYDRAMINE HCL 25 MG
25 TABLET ORAL EVERY 6 HOURS PRN
Status: DISCONTINUED | OUTPATIENT
Start: 2024-09-30 | End: 2024-09-30 | Stop reason: HOSPADM

## 2024-09-30 RX ORDER — LINEZOLID 600 MG/1
600 TABLET, FILM COATED ORAL EVERY 12 HOURS SCHEDULED
Qty: 28 TABLET | Refills: 0 | Status: SHIPPED | OUTPATIENT
Start: 2024-09-30 | End: 2024-10-14

## 2024-09-30 RX ORDER — LINEZOLID 600 MG/1
600 TABLET, FILM COATED ORAL EVERY 12 HOURS SCHEDULED
Status: DISCONTINUED | OUTPATIENT
Start: 2024-09-30 | End: 2024-09-30 | Stop reason: HOSPADM

## 2024-09-30 RX ADMIN — OXYCODONE AND ACETAMINOPHEN 1 TABLET: 325; 10 TABLET ORAL at 14:25

## 2024-09-30 RX ADMIN — SODIUM CHLORIDE, PRESERVATIVE FREE 10 ML: 5 INJECTION INTRAVENOUS at 08:25

## 2024-09-30 RX ADMIN — ENOXAPARIN SODIUM 40 MG: 100 INJECTION SUBCUTANEOUS at 08:25

## 2024-09-30 RX ADMIN — DIPHENHYDRAMINE HYDROCHLORIDE 25 MG: 25 TABLET ORAL at 08:24

## 2024-09-30 RX ADMIN — OXYCODONE AND ACETAMINOPHEN 1 TABLET: 325; 10 TABLET ORAL at 05:04

## 2024-09-30 RX ADMIN — PREGABALIN 200 MG: 100 CAPSULE ORAL at 08:25

## 2024-09-30 RX ADMIN — LINEZOLID 600 MG: 600 TABLET, FILM COATED ORAL at 13:26

## 2024-09-30 ASSESSMENT — PAIN DESCRIPTION - DESCRIPTORS
DESCRIPTORS: ACHING;BURNING;NAGGING
DESCRIPTORS: ACHING;SHARP;SORE
DESCRIPTORS: ACHING;BURNING;NAGGING

## 2024-09-30 ASSESSMENT — PAIN - FUNCTIONAL ASSESSMENT
PAIN_FUNCTIONAL_ASSESSMENT: ACTIVITIES ARE NOT PREVENTED

## 2024-09-30 ASSESSMENT — PAIN SCALES - GENERAL
PAINLEVEL_OUTOF10: 7
PAINLEVEL_OUTOF10: 6
PAINLEVEL_OUTOF10: 9
PAINLEVEL_OUTOF10: 7

## 2024-09-30 ASSESSMENT — PAIN DESCRIPTION - ONSET
ONSET: GRADUAL

## 2024-09-30 ASSESSMENT — PAIN DESCRIPTION - FREQUENCY
FREQUENCY: INTERMITTENT

## 2024-09-30 ASSESSMENT — PAIN DESCRIPTION - LOCATION
LOCATION: KNEE
LOCATION: KNEE
LOCATION: BACK

## 2024-09-30 ASSESSMENT — PAIN DESCRIPTION - ORIENTATION
ORIENTATION: LOWER
ORIENTATION: RIGHT
ORIENTATION: RIGHT

## 2024-09-30 ASSESSMENT — PAIN DESCRIPTION - PAIN TYPE
TYPE: ACUTE PAIN;CHRONIC PAIN
TYPE: ACUTE PAIN
TYPE: ACUTE PAIN

## 2024-09-30 NOTE — PLAN OF CARE
Problem: Discharge Planning  Goal: Discharge to home or other facility with appropriate resources  Outcome: Progressing     Problem: Safety - Adult  Goal: Free from fall injury  Outcome: Progressing     Problem: Neurosensory - Adult  Goal: Achieves stable or improved neurological status  Outcome: Progressing  Goal: Achieves maximal functionality and self care  Outcome: Progressing     Problem: Skin/Tissue Integrity - Adult  Goal: Skin integrity remains intact  Outcome: Progressing  Goal: Incisions, wounds, or drain sites healing without S/S of infection  Outcome: Progressing     Problem: Musculoskeletal - Adult  Goal: Return mobility to safest level of function  Outcome: Progressing  Goal: Maintain proper alignment of affected body part  Outcome: Progressing  Goal: Return ADL status to a safe level of function  Outcome: Progressing

## 2024-09-30 NOTE — CARE COORDINATION
Care Coordination  Met with the patient at bedside to discuss transition care planning. The patient lives with her  in a 1 story home with 4 steps to enter. Her pcp is Dr Mihir Conklin and her pharmacy is [facility]. The patient ambulated with a wide base cane as needed and was independent prior to admission.  Pt [facility] 17/24 and walked 70 feet min assist no device, ot [facility] 18/24. Id was consulted  for right knee pain and swelling. She has cellulitis and was started on Iv Vancomyacin 1 gm iv q24 hrs. Her plan is to return home on po zyvox 600 mg po q12 x 2 weeks. Impression is right prepatellar septic bursitis improving. Script for zyvox was taken to the pharmacy to see if the prior auth needed.       Case Management Assessment  Initial Evaluation    Date/Time of Evaluation: 9/30/2024 1:10 PM  Assessment Completed by: Agnieszka Matamoros RN    If patient is discharged prior to next notation, then this note serves as note for discharge by case management.    Patient Name: Shadia Prince                   YOB: 1950  Diagnosis: Septic arthritis [M00.9]  Pyogenic arthritis of right knee joint, due to unspecified organism [M00.9]                   Date / Time: 9/27/2024 12:57 PM    Patient Admission Status: Inpatient   Readmission Risk (Low < 19, Mod (19-27), High > 27): Readmission Risk Score: 10.5    Current PCP: Mihir Conklin MD  PCP verified by CM?      Chart Reviewed: Yes      History Provided by: Patient  Patient Orientation: Alert and Oriented    Patient Cognition: Alert    Hospitalization in the last 30 days (Readmission):  No    If yes, Readmission Assessment in  Navigator will be completed.    Advance Directives:      Code Status: Full Code   Patient's Primary Decision Maker is: Named in Scanned ACP Document      Discharge Planning:    Patient lives with: Spouse/Significant Other, Children Type of Home: House  Primary Care Giver: Spouse  Patient Support Systems include:

## 2024-10-01 NOTE — PROGRESS NOTES
OCCUPATIONAL THERAPY INITIAL EVALUATION    [facility]  [address] [place], [place]      Date:2024                                                               Patient Name: Shadia Prince  MRN: [id number]  : 1950  Room: [facility]    Evaluating OT: Velia Barba, OTR/L [id number]    Referring Provider:   Authorizing   Jonathan Naik MD      Specific Provider Orders/Date: OT eval and treat (24)    Diagnosis: Septic arthritis [M00.9]  Pyogenic arthritis of right knee joint, due to unspecified organism [M00.9]      Reason for admission: 74 y.o. female who presents right knee pain swelling and warmth.     Surgery/Procedures: none     Pertinent Medical History:    Past Medical History:   Diagnosis Date    Chronic back pain     Hyperlipidemia         *Precautions:  Fall Risk, WBAT R LE, lethargic (s/p medication)    Assessment of current deficits   [x] Functional mobility  [x]ADLs  [x] Strength               []Cognition   [x] Functional transfers   [x] IADLs         [x] Safety Awareness   [x]Endurance   [] Fine Coordination        [x] ROM     [] Vision/perception   []Sensation    []Gross Motor Coordination [x] Balance   [] Delirium                  [x]Motor Control     [] Communication    OT PLAN OF CARE   OT POC based on physician orders, patient diagnosis and results of clinical assessment.       Frequency/Duration: 1-3 days/wk for 1-2 weeks PRN    Specific OT Treatment to include:   ADL retraining/adapted techniques and AE recommendations to increase functional independence within precautions                    Energy conservation techniques to improve tolerance for selfcare routine   Functional transfer/mobility training/DME recommendations for increased independence, safety and fall prevention         Patient/family education to increase safety and functional independence within precautions              Environmental modifications 
  Physician Progress Note      PATIENT:               DWAIN HERNANDEZ  [facility] #:                  [id number]  :                       1950  ADMIT DATE:       2024 12:57 PM  DISCH DATE:        2024 3:39 PM  RESPONDING  PROVIDER #:        Dionisio Alvarez MD          QUERY TEXT:    Pt admitted with septic bursitis. Pt noted to have Hypothermia,elevated   CRP,elevated Acid,elevated ESR. If possible, please document in the progress   notes and discharge summary if you are evaluating and /or treating any of the   following:  The medical record reflects the following:  Risk Factors: Cellulitis  Clinical Indicators: PN -Right prepatellar septic bursitis- improving,PN   -Active Hospital Problems :Diagnosis :  Cellulitis of right lower   extremity [L03.1  WBC-95.7,RR-22,Lactic acid-2.2,Procalcitonin 0.15,ESR-35,CRP-125  Treatment: IV Ancef,IV Vancomycin  Thank you  FADIA Saldivar,CDS  Options provided:  -- Sepsis due to infective bursitis and cellulitis  present on admission  -- cellulitis and  infective bursitis  without Sepsis  -- Other - I will add my own diagnosis  -- Disagree - Not applicable / Not valid  -- Disagree - Clinically unable to determine / Unknown  -- Refer to Clinical Documentation Reviewer    PROVIDER RESPONSE TEXT:    This patient has cellulitis and infective bursitis without Sepsis.    Query created by: Yariel Martin on 10/1/2024 7:30 AM      Electronically signed by:  Dionisio Alvarez MD 10/1/2024 2:14 PM          
4 Eyes Skin Assessment     NAME:  Shadia Prince  YOB: 1950  MEDICAL RECORD NUMBER:  [id number]    The patient is being assessed for  Admission    I agree that at least one RN has performed a thorough Head to Toe Skin Assessment on the patient. ALL assessment sites listed below have been assessed.      Areas assessed by both nurses:    Head, Face, Ears, Shoulders, Back, Chest, Arms, Elbows, Hands, Sacrum. Buttock, Coccyx, Ischium, and Legs. Feet and Heels        Does the Patient have a Wound? No noted wound(s)       Deniz Prevention initiated by RN: Yes  Wound Care Orders initiated by RN: No    Pressure Injury (Stage 3,4, Unstageable, DTI, NWPT, and Complex wounds) if present, place Wound referral order by RN under : No    New Ostomies, if present place, Ostomy referral order under : No     Nurse 1 eSignature: Electronically signed by Jolie De Jesus RN on 9/27/24 at 6:56 PM EDT    **SHARE this note so that the co-signing nurse can place an eSignature**    Nurse 2 eSignature: {Esignature:[id number]}  
CLINICAL PHARMACY NOTE: MEDS TO BEDS    Total # of Prescriptions Filled: 1   The following medications were delivered to the patient:  Linezolid 600mg    Additional Documentation:  Patient's  picked up in pharmacy 9-30-24  
Clinical Pharmacy Note    Vancomycin was stopped today by Dr. Alvraez.  PO linezolid started in anticipation of discharge.    Clinical Pharmacy sign off.     Andrea Wagner PharmD  9/30/2024  12:35 PM   or       
Department of Internal Medicine  Infectious Diseases  Progress  Note      C/C:   Right knee pain, swelling       Denies fever or chills  Reports right knee pain   Afebrile       Current Facility-Administered Medications   Medication Dose Route Frequency Provider Last Rate Last Admin    diphenhydrAMINE (BENADRYL) tablet 25 mg  25 mg Oral Q6H PRN Maryellen Belcher APRN - CNP   25 mg at 09/30/24 0824    sodium chloride flush 0.9 % injection 10 mL  10 mL IntraVENous 2 times per day Jonathan Naik MD   10 mL at 09/30/24 0825    sodium chloride flush 0.9 % injection 10 mL  10 mL IntraVENous PRN Jonathan Naik MD        0.9 % sodium chloride infusion   IntraVENous PRN Jonathan Naik MD        potassium chloride (KLOR-CON M) extended release tablet 40 mEq  40 mEq Oral PRN Jonathan Naik MD        Or    potassium bicarb-citric acid (EFFER-K) effervescent tablet 40 mEq  40 mEq Oral PRN Jonathan Naik MD        Or    potassium chloride 10 mEq/100 mL IVPB (Peripheral Line)  10 mEq IntraVENous PRN Jonathan Naik MD        enoxaparin (LOVENOX) injection 40 mg  40 mg SubCUTAneous Daily Jonathan Naik MD   40 mg at 09/30/24 0825    ondansetron (ZOFRAN-ODT) disintegrating tablet 4 mg  4 mg Oral Q8H PRN Jonathan Naik MD        Or    ondansetron (ZOFRAN) injection 4 mg  4 mg IntraVENous Q6H PRN Jonathan Naik MD        senna (SENOKOT) tablet 8.6 mg  1 tablet Oral Daily PRN Jonathan Naik MD        acetaminophen (TYLENOL) tablet 650 mg  650 mg Oral Q6H PRN Jonathan Naik MD   650 mg at 09/27/24 1952    Or    acetaminophen (TYLENOL) suppository 650 mg  650 mg Rectal Q6H PRN Jonathan Naik MD        amitriptyline (ELAVIL) tablet 25 mg  25 mg Oral Nightly Jonathan Naik MD   25 mg at 09/29/24 1958    oxyCODONE-acetaminophen (PERCOCET)  MG per tablet 1 tablet  1 tablet Oral Q4H PRN Jonathan Naik MD   1 tablet at 09/30/24 0504    pregabalin (LYRICA) capsule 200 mg  200 mg Oral BID Jonathan Naik MD   200 mg at 09/30/24 0832    
Department of Orthopedic Surgery  Resident Progress Note    Patient seen and examined at bedside this morning.  Pain is well-controlled, no new complaints at this time.  Denies chest pain, shortness of breath.  Patient reports she is doing much well this morning compared to yesterday.      VITALS:  /66   Pulse 70   Temp 96.9 °F (36.1 °C) (Temporal)   Resp 16   Ht 1.549 m (5' 1\")   Wt 59 kg (130 lb 1.1 oz)   SpO2 94%   BMI 24.58 kg/m²     General: alert and oriented to person, place and time, well-developed and well-nourished, in no acute distress    MUSCULOSKELETAL:   right lower extremity:  Dressings clean dry and intact  Compartments are soft compressible  +PF/DF/EHL  +2/4 DP & PT pulses, Brisk Cap refill, Toes warm and perfused  Distal sensation grossly intact to Peroneals, Sural, Saphenous, and tibial nrs     CBC:   Lab Results   Component Value Date/Time    WBC 11.1 09/28/2024 06:38 AM    HGB 12.9 09/28/2024 06:38 AM    HCT 40.2 09/28/2024 06:38 AM     09/28/2024 06:38 AM     PT/INR:    Lab Results   Component Value Date/Time    PROTIME 11.0 11/18/2019 09:24 AM    INR 1.0 11/18/2019 09:24 AM         ASSESSMENT  Right knee cellulitis    PLAN      Continue physical therapy and protocol: WBAT -right LE  Continue antibiotics per admitting  PT/OT  Pain Control: IV and PO  No acute orthopedic intervention this time.  Orthopedics will continue to follow peripherally.  Please reach out with any questions.    Electronically signed by Mario Villanueva DO on 9/28/2024 at 7:20 AM  Note: This report was completed using [employer] voiced recognition software.  Every effort has been made to ensure accuracy; however, inadvertent computerized transcription errors may be present.    
Dr Heena kumarl  
ID consult called  
ID notified that there is no script for the Zyvox   
Internal Medicine Progress Note    Patient's name: Shadia Prince  : 1950  Chief complaints (on day of admission): sent by PCP for admission (Pt R knee red and warm to touch. Pt states PCP wants her admitted)  Admission date: 2024  Date of service: 2024   Room: [address] ORTHO-TRAUMA  Primary care physician: Mihir Conklin MD  Reason for visit: Follow-up for  cellulitis, knee pain    Subjective  Shadia was seen and examined.    Review of Systems NEG x 10  There are no new complaints of chest pain, shortness of breath, abdominal pain, nausea, vomiting, diarrhea, constipation.    Hospital Medications  Current Facility-Administered Medications   Medication Dose Route Frequency Provider Last Rate Last Admin    sodium chloride flush 0.9 % injection 10 mL  10 mL IntraVENous 2 times per day Jonathan Naik MD   10 mL at 24 0826    sodium chloride flush 0.9 % injection 10 mL  10 mL IntraVENous PRN Jonathan Naik MD        0.9 % sodium chloride infusion   IntraVENous PRN Jonathan Naik MD        potassium chloride (KLOR-CON M) extended release tablet 40 mEq  40 mEq Oral PRN Jonathan Naik MD        Or    potassium bicarb-citric acid (EFFER-K) effervescent tablet 40 mEq  40 mEq Oral PRN Jonathan Naik MD        Or    potassium chloride 10 mEq/100 mL IVPB (Peripheral Line)  10 mEq IntraVENous PRN Jonathan Naik MD        enoxaparin (LOVENOX) injection 40 mg  40 mg SubCUTAneous Daily Jonathan Naik MD   40 mg at 24 0825    ondansetron (ZOFRAN-ODT) disintegrating tablet 4 mg  4 mg Oral Q8H PRN Jonathan Naik MD        Or    ondansetron (ZOFRAN) injection 4 mg  4 mg IntraVENous Q6H PRN Jonathan Naik MD        senna (SENOKOT) tablet 8.6 mg  1 tablet Oral Daily PRN Jonathan Naik MD        acetaminophen (TYLENOL) tablet 650 mg  650 mg Oral Q6H PRN Jonathan Naik MD   650 mg at 24    Or    acetaminophen (TYLENOL) suppository 650 mg  650 mg Rectal Q6H PRN Jonathan Naik MD        
Patient recently admitted and brought medication from home in a pill calendar. Some medications are narcotics. I was instructed by pharmacy to count medication and leave it in narc box in the med room. They said that they would not keep or count narcotics to be given to patient since it is something we have available already. Patient currently has 8 percocet in pill calendar.   
Patient seen evaluated.  Resting comfortably in bed.  Able to actively flex and extend the right knee.  Complains of pain at terminal flexion of right knee.  No pain with passive motion.  Mild erythema and swelling with a mild effusion appreciated.  Patient had 2 attempts at knee aspiration that were both done infrapatellar resulting in dry taps.  We discussed attempting a lateral suprapatellar approach and patient was agreeable to this plan as long as she was numbed up.  Right knee was sterilely prepped using ChloraPrep solution approximately 5 cc of lidocaine without epinephrine were used as a local block.  Right knee aspiration was attempted using 18-gauge needle, resulting in dry tap.    Patient's ESR and CRP are both elevated however her white count is normal and patient is is able to actively and passively range her knee with minimal pain only complaining of pain at terminal flexion describing it as pressure in the anterior aspect of her knee likely secondary to her mild effusion.  We discussed monitoring the patient on IV antibiotics and pain medication.  Patient was agreeable plan.  All questions this time.  Continue weightbearing as tolerated to right lower extremity  ID recs  Orthopedics will continue to follow while patient is admitted.    Electronically signed by Mario Villanueva DO on 9/27/2024 at 6:43 PM   
Pharmacy Consultation Note  (Antibiotic Dosing and Monitoring)    Initial consult date: 9/27/24  Consulting physician/provider: Dr. Esparza  Drug: Vancomycin  Indication: skin and soft tissue    Age/  Gender Height Weight IBW  Allergy Information   74 y.o./female 154.9 cm   59 kg     Patient weight not recorded   Biaxin [clarithromycin], Darvon [fd&c red #40-fd&c yellow #10-propoxyphene], Erythromycin base, Iv dye [iodides], Keflex [cephalexin], Meloxicam, Methadone, Niacin and related, Penicillins, Polysorbate, Statins, Yellow dyes (non-tartrazine), and Sulfa antibiotics      Renal Function:  Recent Labs     09/27/24  1320   BUN 23   CREATININE 1.1*     No intake or output data in the 24 hours ending 09/27/24 1832    Vancomycin Monitoring:  Trough:  No results for input(s): \"VANCOTROUGH\" in the last 72 hours.  Random:  No results for input(s): \"VANCORANDOM\" in the last 72 hours.    Vancomycin Administration Times:  Recent vancomycin administrations        No vancomycin IV orders with administrations found.            Orders not given:            vancomycin (VANCOCIN) 1,000 mg in sodium chloride 0.9 % 250 mL IVPB ([id number])                    Assessment:  Patient is a 74 y.o. female who has been initiated on vancomycin  CrCl cannot be calculated (Unknown ideal weight.).  To dose vancomycin, pharmacy will be utilizing [employer] calculation software for goal AUC/JANNA 400-600 mg/L-hr (predicted AUC/JANNA = 468, Tr =14.9 mcg/mL)    Plan:  Received 1000 mg dose  Will continue vancomycin 1000 mg IV every 24 hours  Will check vancomycin levels when appropriate  Will continue to monitor renal function   Pharmacy to follow    Thank you for your consult    Anny Pleitez PharmD BCPS 9/27/2024 6:32 PM  [phone]    
Pharmacy Consultation Note  (Antibiotic Dosing and Monitoring)    Initial consult date: 9/27/24  Consulting physician/provider: Dr. Esparza  Drug: Vancomycin  Indication: skin and soft tissue    Age/  Gender Height Weight IBW  Allergy Information   74 y.o./female 154.9 cm  59 kg (130 lb 1.1 oz)59 kg     Ideal body weight: 47.8 kg (105 lb 6.1 oz)  Adjusted ideal body weight: 52.3 kg (115 lb 4.1 oz)   Biaxin [clarithromycin], Darvon [fd&c red #40-fd&c yellow #10-propoxyphene], Erythromycin base, Iv dye [iodides], Keflex [cephalexin], Meloxicam, Methadone, Niacin and related, Penicillins, Polysorbate, Statins, Yellow dyes (non-tartrazine), and Sulfa antibiotics      Renal Function:  Recent Labs     09/27/24  1320 09/28/24  0638   BUN 23 25*   CREATININE 1.1* 1.0       Intake/Output Summary (Last 24 hours) at 9/28/2024 0921  Last data filed at 9/28/2024 0239  Gross per 24 hour   Intake 500 ml   Output 400 ml   Net 100 ml       Vancomycin Monitoring:  Trough:  No results for input(s): \"VANCOTROUGH\" in the last 72 hours.  Random:    Recent Labs     09/28/24  0638   VANCORANDOM 11.4       Vancomycin Administration Times:  Recent vancomycin administrations                     vancomycin (VANCOCIN) 1,000 mg in sodium chloride 0.9 % 250 mL IVPB ([id number]) (mg) 1,000 mg New Bag 09/27/24 1954                    Assessment:  Patient is a 74 y.o. female who has been initiated on vancomycin  Estimated Creatinine Clearance: 41 mL/min (based on SCr of 1 mg/dL).  To dose vancomycin, pharmacy will be utilizing [employer] calculation software for goal AUC/JANNA 400-600 mg/L-hr (predicted AUC/JANNA = 460, Tr =13.9 mcg/mL)  9/27 : Scr 1.1, predicted AUC/JANNA= 468, tr= 14.9  9/28 Scr 1, predicted AUC/JANNA = 460, tr= 13.9, Vancomycin random level 11.4 (~ 10.5 hours)    Plan:  Received 1000 mg dose  Will continue vancomycin 1000 mg IV every 24 hours  Will check vancomycin levels when appropriate  Will continue to monitor renal function 
Pharmacy Consultation Note  (Antibiotic Dosing and Monitoring)    Initial consult date: 9/27/24  Consulting physician/provider: Dr. Esparza  Drug: Vancomycin  Indication: skin and soft tissue    Age/  Gender Height Weight IBW  Allergy Information   74 y.o./female 154.9 cm  59 kg (130 lb 1.1 oz)59 kg     Ideal body weight: 47.8 kg (105 lb 6.1 oz)  Adjusted ideal body weight: 52.3 kg (115 lb 4.1 oz)   Biaxin [clarithromycin], Darvon [fd&c red #40-fd&c yellow #10-propoxyphene], Erythromycin base, Iv dye [iodides], Keflex [cephalexin], Meloxicam, Methadone, Niacin and related, Penicillins, Polysorbate, Statins, Yellow dyes (non-tartrazine), and Sulfa antibiotics      Renal Function:  Recent Labs     09/27/24  1320 09/28/24  0638 09/29/24  0622   BUN 23 25* 22   CREATININE 1.1* 1.0 1.0       Intake/Output Summary (Last 24 hours) at 9/29/2024 1303  Last data filed at 9/28/2024 2003  Gross per 24 hour   Intake 950 ml   Output --   Net 950 ml       Vancomycin Monitoring:  Trough:  No results for input(s): \"VANCOTROUGH\" in the last 72 hours.  Random:    Recent Labs     09/28/24  0638   VANCORANDOM 11.4       Vancomycin Administration Times:  Recent vancomycin administrations                     vancomycin (VANCOCIN) 1,000 mg in sodium chloride 0.9 % 250 mL IVPB ([id number]) (mg) 1,000 mg New Bag 09/28/24 2003    vancomycin (VANCOCIN) 1,000 mg in sodium chloride 0.9 % 250 mL IVPB (Vvcy1Cwf) (mg) 1,000 mg New Bag 09/27/24 1954                        Assessment:  Patient is a 74 y.o. female who has been initiated on vancomycin  Estimated Creatinine Clearance: 41 mL/min (based on SCr of 1 mg/dL).  To dose vancomycin, pharmacy will be utilizing [employer] calculation software for goal AUC/JANNA 400-600 mg/L-hr (predicted AUC/JANNA = 460, Tr =13.9 mcg/mL)  9/27 : Scr 1.1, predicted AUC/JANNA= 468, tr= 14.9  9/28 Scr 1, predicted AUC/JANNA = 460, tr= 13.9, Vancomycin random level 11.4 (~ 10.5 hours)  9/29 Scr 1.0    Plan:  Will continue 
Physical Therapy  Physical Therapy Initial Assessment     Name: Shadia Prince  : 1950  MRN: [id number]      Date of Service: 2024    Evaluating PT:  Sarbjit Edmonds PT, DPT [id number]    Room #:  5403/5403-A  Diagnosis:  Septic arthritis [M00.9]  Pyogenic arthritis of right knee joint, due to unspecified organism [M00.9]  PMHx/PSHx:    Past Medical History:   Diagnosis Date    Chronic back pain     Hyperlipidemia      Procedure/Surgery:  none  Precautions:  Falls, WBAT RLE  Equipment Needs:  TBD    SUBJECTIVE:    Pt lives with , unable to assist, in a 1 story home with 4 step(s) to enter and no rail(s).      Pt ambulated with wide base cane PRN and was independent PTA.    OBJECTIVE:   Initial Evaluation  Date: 24 Treatment Short Term/ Long Term   Goals   AM-PAC 6 Clicks      Was pt agreeable to Eval/treatment? Yes     Does pt have pain? R knee tenderness     Bed Mobility  Rolling: NT  Supine to sit: SBA  Sit to supine: SBA  Scooting: SBA  Mod Independent   Transfers Sit to stand: Leia  Stand to sit: Leia  Stand pivot: Leia no device  Mod Independent with AAD   Ambulation   70 feet with Leia no device  >400 feet with mod Independent with AAD   Stair negotiation: ascended and descended NT  >4 steps with 1 rail Mod Independent   ROM BUE:  WFL  BLE:  WFL     Strength BUE:  WFL  BLE:  4/5  Increase by 1/3 MMT grade   Balance Sitting EOB:  Leia dynamic  Dynamic Standing:  Leia no device  Sitting EOB:  Independent  Dynamic Standing:  Mod Independent with AAD     Pt is A & O x 3-4 not month  Sensation:   no reported paresthesias  Edema:  R knee    Therapeutic Exercises:  BLE ROM x 5 reps    Patient education  Pt educated on safety    Patient response to education:   Pt verbalized understanding Pt demonstrated skill Pt requires further education in this area   yes yes yes     ASSESSMENT:    Conditions Requiring Skilled Therapeutic Intervention:    [x]Decreased strength     []Decreased 
BLOODU Negative 11/18/2019 10:00 AM    GLUCOSEU NEGATIVE 10/04/2023 05:08 PM       ABG:  No results found for: \"VCI2GHW\", \"BEART\", \"T7WTPFPK\", \"PHART\", \"THGBART\", \"ZZT7SLR\", \"PO2ART\", \"DOQ2PAF\"    MICROBIOLOGY:    Blood culture - neg         Radiology :   X ray right knee -  1.  Minimal osteoarthritic change of the patellofemoral compartment.  No  evidence of acute fracture or traumatic malalignment.    2.  Prominence of the suprapatellar bursa may suggest a very small joint  effusion.    3.  Prominence of the prepatellar soft tissues suggests possible area of  contusion or prepatellar bursitis.             IMPRESSION:     Right prepatellar septic bursitis    RECOMMENDATIONS:      Continue on IV vancomycin 1 gram IV q 24 hrs   Monitor clinically

## 2024-10-01 NOTE — DISCHARGE SUMMARY
oxyCODONE-acetaminophen  MG per tablet  Commonly known as: PERCOCET     pravastatin 40 MG tablet  Commonly known as: PRAVACHOL            STOP taking these medications      ipratropium 0.5 mg-albuterol 2.5 mg 0.5-2.5 (3) MG/3ML Soln nebulizer solution  Commonly known as: DUONEB     pregabalin 200 MG capsule  Commonly known as: LYRICA               Where to Get Your Medications        You can get these medications from any pharmacy    Bring a paper prescription for each of these medications  linezolid 600 MG tablet         Discharge Medication List as of 9/30/2024  2:42 PM        Discharge Medication List as of 9/30/2024  2:42 PM        STOP taking these medications       ipratropium-albuterol (DUONEB) 0.5-2.5 (3) MG/3ML SOLN nebulizer solution Comments:   Reason for Stopping:         pregabalin (LYRICA) 200 MG capsule Comments:   Reason for Stopping:             Discharge Medication List as of 9/30/2024  2:42 PM        START taking these medications    Details   linezolid (ZYVOX) 600 MG tablet Take 1 tablet by mouth every 12 hours for 28 doses, Disp-28 tablet, R-0Print             INTERNAL MEDICINE FOLLOW UP/INSTRUCTIONS:  Follow-up with primary care physician within 1 week of discharge from hospital  Please review changes to pre-hospital admission medications and prescriptions for new medications upon discharge from the hospital with PCP  Please review results of labs and imaging studies with PCP  Follow-up with consultants as directed by them   If recurrence or worsening of symptoms please call primary care physician or return to the ER immediately  Diet: No diet orders on file    Preparing for this patient's discharge, including paperwork, orders, instructions, and meeting with patient did required >35 minutes.    Electronically signed by Jonathan Naik MD on 10/1/2024 at 4:20 PM

## 2024-10-02 LAB
MICROORGANISM SPEC CULT: NORMAL
MICROORGANISM SPEC CULT: NORMAL
SERVICE CMNT-IMP: NORMAL
SERVICE CMNT-IMP: NORMAL
SPECIMEN DESCRIPTION: NORMAL
SPECIMEN DESCRIPTION: NORMAL

## 2024-12-09 ENCOUNTER — HOSPITAL ENCOUNTER (EMERGENCY)
Age: 74
Discharge: HOME | End: 2024-12-09
Attending: EMERGENCY MEDICINE
Payer: COMMERCIAL

## 2024-12-09 ENCOUNTER — APPOINTMENT (OUTPATIENT)
Dept: CT IMAGING | Age: 74
End: 2024-12-09
Payer: COMMERCIAL

## 2024-12-09 VITALS
HEART RATE: 68 BPM | BODY MASS INDEX: 24.54 KG/M2 | DIASTOLIC BLOOD PRESSURE: 66 MMHG | OXYGEN SATURATION: 100 % | WEIGHT: 125 LBS | RESPIRATION RATE: 18 BRPM | HEIGHT: 60 IN | SYSTOLIC BLOOD PRESSURE: 174 MMHG | TEMPERATURE: 97.5 F

## 2024-12-09 DIAGNOSIS — S20.219A CONTUSION OF RIB, UNSPECIFIED LATERALITY, INITIAL ENCOUNTER: Primary | ICD-10-CM

## 2024-12-09 DIAGNOSIS — K44.9 HIATAL HERNIA: ICD-10-CM

## 2024-12-09 PROCEDURE — 6370000000 HC RX 637 (ALT 250 FOR IP)

## 2024-12-09 PROCEDURE — 71250 CT THORAX DX C-: CPT

## 2024-12-09 PROCEDURE — 99284 EMERGENCY DEPT VISIT MOD MDM: CPT

## 2024-12-09 RX ORDER — PREDNISONE 20 MG/1
20 TABLET ORAL 2 TIMES DAILY
Qty: 10 TABLET | Refills: 0 | Status: SHIPPED | OUTPATIENT
Start: 2024-12-09 | End: 2024-12-14

## 2024-12-09 RX ORDER — LIDOCAINE 50 MG/G
1 PATCH TOPICAL EVERY 24 HOURS
Qty: 10 PATCH | Refills: 0 | Status: SHIPPED | OUTPATIENT
Start: 2024-12-09 | End: 2024-12-19

## 2024-12-09 RX ORDER — OXYCODONE AND ACETAMINOPHEN 5; 325 MG/1; MG/1
1 TABLET ORAL ONCE
Status: COMPLETED | OUTPATIENT
Start: 2024-12-09 | End: 2024-12-09

## 2024-12-09 RX ADMIN — OXYCODONE HYDROCHLORIDE AND ACETAMINOPHEN 1 TABLET: 5; 325 TABLET ORAL at 09:30

## 2024-12-09 ASSESSMENT — PAIN DESCRIPTION - FREQUENCY: FREQUENCY: CONTINUOUS

## 2024-12-09 ASSESSMENT — PAIN DESCRIPTION - DESCRIPTORS: DESCRIPTORS: SHARP

## 2024-12-09 ASSESSMENT — PAIN DESCRIPTION - LOCATION: LOCATION: RIB CAGE

## 2024-12-09 ASSESSMENT — PAIN SCALES - GENERAL
PAINLEVEL_OUTOF10: 4
PAINLEVEL_OUTOF10: 7

## 2024-12-09 ASSESSMENT — PAIN - FUNCTIONAL ASSESSMENT: PAIN_FUNCTIONAL_ASSESSMENT: 0-10

## 2024-12-09 NOTE — ED PROVIDER NOTES
[facility] EMERGENCY DEPARTMENT  EMERGENCY DEPARTMENT ENCOUNTER        Pt Name: Shadia Prince  MRN: [id number]  Birthdate 1950  Date of evaluation: 12/9/2024  Provider: Jerald Stokes DO  PCP: Mihir Conklin MD  Note Started: 9:25 AM EST 12/9/24    CHIEF COMPLAINT       Chief Complaint   Patient presents with    Rib Pain (injury)    Shortness of Breath       HISTORY OF PRESENT ILLNESS: 1 or more Elements   History From: patient    Limitations to history : None    Shadia Prince is a 74 y.o. female who presents to emergency department for left posterior rib pain after she sustained a fall 2 days ago.  She reports that she slipped on a blanket and hit the back of her ribs on a nightstand.  Did not hit her head or lose consciousness.  Patient does smoke.  Has a contrast allergy.  Patient denies fever, chills, headache, abdominal pain, nausea, vomiting, diarrhea, dysuria, hematuria, hematochezia, and melena.    Nursing Notes were all reviewed and agreed with or any disagreements were addressed in the HPI.        REVIEW OF SYSTEMS :           Positives and Pertinent negatives as per HPI.     SURGICAL HISTORY     Past Surgical History:   Procedure Laterality Date    BACK SURGERY  2001    COLONOSCOPY      ELBOW SURGERY  2002    HIP SURGERY      right    HYSTERECTOMY, TOTAL ABDOMINAL (CERVIX REMOVED)      LUMBAR SPINE SURGERY N/A 11/20/2019    REMOVAL OF OLD HARDWARE L5-S1 FUSION AND L4-L5 POSTERIOR LUMBAR INTERBODY FUSION performed by Bonifacio Dougherty MD at [facility] OR    WRIST SURGERY  2002       CURRENTMEDICATIONS       Previous Medications    AMITRIPTYLINE (ELAVIL) 25 MG TABLET    Take 1 tablet by mouth nightly.    DULOXETINE (CYMBALTA) 60 MG EXTENDED RELEASE CAPSULE    take 1 capsule by mouth once daily    FERROUS SULFATE (IRON 325) 325 (65 FE) MG TABLET    Take 1 tablet by mouth Twice a Week    OXYCODONE-ACETAMINOPHEN (PERCOCET)  MG PER TABLET    Take 1 tablet by

## 2024-12-09 NOTE — DISCHARGE INSTRUCTIONS
Call family doctor tomorrow and schedule a followup appointment to be seen in 2 days    Continue your previous pain medications as prescribed    Have your doctor obtain  finalized report of CT scan today    CT CHEST WO CONTRAST   Final Result   1. No acute traumatic findings of the chest.  No chest wall contusion or   hematoma.   2. Small hiatal hernia.

## (undated) DEVICE — SHEET,DRAPE,40X58,STERILE: Brand: MEDLINE

## (undated) DEVICE — SPHERE EYE 1 MRK GUIDANCE PASS STEALTHSTATION 1PK/EA

## (undated) DEVICE — 5.0MM PRECISION ROUND

## (undated) DEVICE — PATIENT RETURN ELECTRODE, SINGLE-USE, CONTACT QUALITY MONITORING, ADULT, WITH 9FT CORD, FOR PATIENTS WEIGING OVER 33LBS. (15KG): Brand: MEGADYNE

## (undated) DEVICE — BAG TRNSF AUTOLGS SUCT AND ANTICOAG LN AUTOLOG

## (undated) DEVICE — LABEL MED 4 IN SURG PANEL W/ PEN STRL

## (undated) DEVICE — LOCATOR RAD PASS SPHR MRK NAVIGATION BALL DISP STLTH STN

## (undated) DEVICE — INTENDED FOR TISSUE SEPARATION, AND OTHER PROCEDURES THAT REQUIRE A SHARP SURGICAL BLADE TO PUNCTURE OR CUT.: Brand: BARD-PARKER ® STAINLESS STEEL BLADES

## (undated) DEVICE — RESERVOIR CARDOTMY C4L HARDSHELL W/ 40UM FLTR EL SER 4 PRT

## (undated) DEVICE — HYPODERMIC SAFETY NEEDLE: Brand: MAGELLAN

## (undated) DEVICE — 1000 S-DRAPE TOWEL DRAPE 10/BX: Brand: STERI-DRAPE™

## (undated) DEVICE — GRADUATE

## (undated) DEVICE — 1.5L THIN WALL CAN: Brand: CRD

## (undated) DEVICE — NEEDLE SPNL L3.5IN PNK HUB S STL REG WALL FIT STYL W/ QNCKE

## (undated) DEVICE — DRESSING ADH N ADH 8X35 IN 6X175 IN SFT CLTH MEDIPORE +

## (undated) DEVICE — DOUBLE BASIN SET: Brand: MEDLINE INDUSTRIES, INC.

## (undated) DEVICE — 3M™ IOBAN™ 2 ANTIMICROBIAL INCISE DRAPE 6650EZ: Brand: IOBAN™ 2

## (undated) DEVICE — EXTRAS UGOKWE

## (undated) DEVICE — MARKER,SKIN,WI/RULER AND LABELS: Brand: MEDLINE

## (undated) DEVICE — BLADE ES L6IN ELASTOMERIC COAT EXT DURABLE BEND UPTO 90DEG

## (undated) DEVICE — GOWN,SIRUS,FABRNF,XL,20/CS: Brand: MEDLINE

## (undated) DEVICE — BRUNNS CURRETTES

## (undated) DEVICE — KIT PLT RATIO DISPNS KT 2IN CANN TIP SPRY TIP DISP MAGELLAN

## (undated) DEVICE — DRILL SYSTEM 7

## (undated) DEVICE — SYRINGE 20ML LL S/C 50

## (undated) DEVICE — GOWN,SIRUS,FABRNF,L,20/CS: Brand: MEDLINE

## (undated) DEVICE — SURGICAL PROCEDURE PACK LAMINECTOMY LUMBAR

## (undated) DEVICE — SET SPINAL NEURO STNEU1

## (undated) DEVICE — GLOVE SURG SZ 65 THK91MIL LTX FREE SYN POLYISOPRENE

## (undated) DEVICE — TUBING SUCT 12FR MAL ALUM SHFT FN CAP VENT UNIV CONN W/ OBT

## (undated) DEVICE — TOWEL,OR,DSP,ST,BLUE,DLX,10/PK,8PK/CS: Brand: MEDLINE

## (undated) DEVICE — DRAPE C ARM UNIV W41XL74IN CLR PLAS XR VELC CLSR POLY STRP

## (undated) DEVICE — Device

## (undated) DEVICE — CODMAN® SURGICAL PATTIES 1/2" X 1" (1.27CM X 2.54CM): Brand: CODMAN®

## (undated) DEVICE — SKIN AFFIX SURG ADHESIVE 72/CS 0.55ML: Brand: MEDLINE

## (undated) DEVICE — SHEET, T, LAPAROTOMY, STERILE: Brand: MEDLINE

## (undated) DEVICE — JACKSON TABLE POSITIONER KIT: Brand: MEDLINE INDUSTRIES, INC.

## (undated) DEVICE — COVER,TABLE,60X90,STERILE: Brand: MEDLINE

## (undated) DEVICE — THE MILL DISPOSABLE - MEDIUM

## (undated) DEVICE — READY WET SKIN SCRUB TRAY-LF: Brand: MEDLINE INDUSTRIES, INC.

## (undated) DEVICE — CLOTH SURG PREP PREOPERATIVE CHLORHEXIDINE GLUC 2% READYPREP

## (undated) DEVICE — GAUZE,SPONGE,4"X4",16PLY,XRAY,STRL,LF: Brand: MEDLINE

## (undated) DEVICE — GLOVE ORANGE PI 8   MSG9080

## (undated) DEVICE — GLOVE SURG SZ 85 STD WHT LTX SYN POLYMER BEAD REINF ANTI RL

## (undated) DEVICE — KIT EVAC 400CC DIA1/8IN H PAT 12.5IN 3 SPR RND SHP PVC DRN

## (undated) DEVICE — APPLICATOR PREP 26ML 0.7% IOD POVACRYLEX 74% ISO ALC ST

## (undated) DEVICE — TOTAL TRAY, DB, 100% SILI FOLEY, 16FR 10: Brand: MEDLINE